# Patient Record
Sex: FEMALE | Race: WHITE | Employment: FULL TIME | URBAN - METROPOLITAN AREA
[De-identification: names, ages, dates, MRNs, and addresses within clinical notes are randomized per-mention and may not be internally consistent; named-entity substitution may affect disease eponyms.]

---

## 2017-07-24 ENCOUNTER — OFFICE VISIT (OUTPATIENT)
Dept: FAMILY MEDICINE CLINIC | Age: 42
End: 2017-07-24

## 2017-07-24 VITALS
RESPIRATION RATE: 20 BRPM | WEIGHT: 210 LBS | HEART RATE: 69 BPM | SYSTOLIC BLOOD PRESSURE: 124 MMHG | DIASTOLIC BLOOD PRESSURE: 82 MMHG | BODY MASS INDEX: 37.21 KG/M2 | HEIGHT: 63 IN | TEMPERATURE: 98.8 F | OXYGEN SATURATION: 98 %

## 2017-07-24 DIAGNOSIS — M53.3 SACRAL PAIN: Primary | ICD-10-CM

## 2017-07-24 DIAGNOSIS — E55.9 VITAMIN D DEFICIENCY: ICD-10-CM

## 2017-07-24 DIAGNOSIS — F51.04 PSYCHOPHYSIOLOGICAL INSOMNIA: ICD-10-CM

## 2017-07-24 DIAGNOSIS — Z00.00 ROUTINE GENERAL MEDICAL EXAMINATION AT A HEALTH CARE FACILITY: ICD-10-CM

## 2017-07-24 DIAGNOSIS — F41.9 ANXIETY: ICD-10-CM

## 2017-07-24 DIAGNOSIS — H61.21 IMPACTED CERUMEN OF RIGHT EAR: ICD-10-CM

## 2017-07-24 RX ORDER — TRAZODONE HYDROCHLORIDE 50 MG/1
50 TABLET ORAL
Qty: 30 TAB | Refills: 1 | Status: SHIPPED | OUTPATIENT
Start: 2017-07-24 | End: 2017-08-14 | Stop reason: SDUPTHER

## 2017-07-24 NOTE — PROGRESS NOTES
Assessment/Plan:    1. Sacral pain  -xray. Home stretches. Monitor. If persists, can do PT  - XR SACRUM AND COCCYX; Future    2. Impacted cerumen of right ear  - REMOVAL IMPACTED CERUMEN IRRIGATION/LVG UNILAT; Future    3. Psychophysiological insomnia  - traZODone (DESYREL) 50 mg tablet; Take 1 Tab by mouth nightly as needed for Sleep. Dispense: 30 Tab; Refill: 1    4. Vitamin D deficiency  - VITAMIN D, 25 HYDROXY; Future    5. Routine general medical examination at a health care facility  - METABOLIC PANEL, COMPREHENSIVE; Future  - LIPID PANEL; Future  - CBC W/O DIFF; Future    The plan was discussed with the patient. The patient verbalized understanding and is in agreement with the plan. All medication potential side effects were discussed with the patient. Health Maintenance:   Health Maintenance   Topic Date Due    INFLUENZA AGE 9 TO ADULT  08/01/2017    PAP AKA CERVICAL CYTOLOGY  01/01/2019    DTaP/Tdap/Td series (2 - Td) 01/01/2020       Wade Ch is a 35 y.o. female and presents with Physical     Subjective:  Feels well. Pt c/o dull constant R sacral pain. Worse with getting out of the car. Pain doesn't radiate. No pain at rest.  Has been ongoing x several weeks. Notes some difficulty sleeping. Melatonin was working, but then stopped. ROS:  Constitutional: No recent weight change. No weakness/fatigue. No f/c. Skin: No rashes, change in nails/hair, itching   HENT: No HA, dizziness. No hearing loss/tinnitus. No nasal congestion/discharge. Eyes: No change in vision, double/blurred vision or eye pain/redness. Cardiovascular: No CP/palpitations. No CRISTOBAL/orthopnea/PND. Respiratory: No cough/sputum, dyspnea, wheezing. Gastointestinal: No dysphagia, reflux. No n/v. No constipation/diarrhea. No melena/rectal bleeding. Genitourinary: No dysuria, urinary hesitancy, nocturia, hematuria. No incontinence. Musculoskeletal: No joint pain/stiffness.   No muscle pain/tenderness. Endo: No heat/cold intolerance, no polyuria/polydypsia. Heme: No h/o anemia. No easy bleeding/bruising. Allergy/Immunology: No seasonal rhinitis. Denies frequent colds, sinus/ear infections. Neurological: No seizures/numbness/weakness. No paresthesias. Psychiatric:  No depression, anxiety. The problem list was updated as a part of today's visit. Patient Active Problem List   Diagnosis Code    Anxiety F41.9    Vitamin D deficiency E55.9    Obesity (BMI 30-39. 9) E66.9    Irregular menstrual cycle N92.6    Menorrhagia N92.0    Depression F32.9       The PSH, FH were reviewed. SH:  Social History   Substance Use Topics    Smoking status: Former Smoker     Types: Cigarettes     Start date: 3/18/1989     Quit date: 3/18/1999    Smokeless tobacco: Never Used    Alcohol use 0.5 oz/week     1 Standard drinks or equivalent per week      Comment: < 1/week       Medications/Allergies:  Current Outpatient Prescriptions on File Prior to Visit   Medication Sig Dispense Refill    ALPRAZolam (XANAX) 0.25 mg tablet Take 1 Tab by mouth two (2) times daily as needed for Anxiety. Max Daily Amount: 0.5 mg. 30 Tab 0    ERGOCALCIFEROL, VITAMIN D2, (VITAMIN D2 PO) Take 1 Tab by mouth daily. No current facility-administered medications on file prior to visit. No Known Allergies    Objective:  Visit Vitals    /82    Pulse 69    Temp 98.8 °F (37.1 °C) (Oral)    Resp 20    Ht 5' 3.25\" (1.607 m)    Wt 210 lb (95.3 kg)    LMP 07/08/2017    SpO2 98%    BMI 36.91 kg/m2      Constitutional: Well developed, nourished, no distress, alert, obese habitus   HENT: Exterior ears and tympanic membranes normal bilaterally. Supple neck. No thyromegaly or lymphadenopathy. Oropharynx clear and moist mucous membranes. +impacted cerumen on R   Eyes: Conjunctiva normal. PERRL. CV: S1, S2.  RRR. No murmurs/rubs. No thrills palpated. No carotid bruits. Intact distal pulses.   No edema.   Pulm: No abnormalities on inspection. Clear to auscultation bilaterally. No wheezing/rhonchi. Normal effort. GI: Soft, nontender, nondistended. Normal active bowel sounds. MS: Gait normal.  Joints without deformity/tenderness. Strength intact bilateral upper and lower ext. Normal ROM all extremities. No paraspinal tenderness. Neuro: A/O x 3. No focal motor or sensory deficits. Speech normal.   Skin: No lesions/rashes on inspection. Psych: Appropriate affect, judgement and insight. Short-term memory intact.

## 2017-07-24 NOTE — PATIENT INSTRUCTIONS

## 2017-07-24 NOTE — PROGRESS NOTES
Raymond Davis is a 35 y.o. female  Patient here for physical.      1. Have you been to the ER, urgent care clinic since your last visit? Hospitalized since your last visit? No    2. Have you seen or consulted any other health care providers outside of the [facility] since your last visit? Include any pap smears or colon screening.  No

## 2017-07-24 NOTE — MR AVS SNAPSHOT
Visit Information Date & Time Provider Department Dept. Phone Encounter #  
 7/24/2017  3:30 PM [facility] [phone] [id number] Upcoming Health Maintenance Date Due INFLUENZA AGE 9 TO ADULT 8/1/2017 PAP AKA CERVICAL CYTOLOGY 1/1/2019 DTaP/Tdap/Td series (2 - Td) 1/1/2020 Allergies as of 7/24/2017  Review Complete On: 7/24/2017 By: Ramo Rosado MD  
 No Known Allergies Current Immunizations  Reviewed on 10/2/2014 Name Date Influenza Vaccine 10/2/2014, 10/25/2013 Influenza Vaccine (Quad) PF 8/29/2016  4:13 PM  
  
 Not reviewed this visit You Were Diagnosed With   
  
 Codes Comments Sacral pain    -  Primary ICD-10-CM: M53.3 ICD-9-CM: 724.6 Impacted cerumen of right ear     ICD-10-CM: H61.21 ICD-9-CM: 380.4 Psychophysiological insomnia     ICD-10-CM: F51.04 
ICD-9-CM: 307.42 Vitamin D deficiency     ICD-10-CM: E55.9 ICD-9-CM: 268.9 Routine general medical examination at a health care facility     ICD-10-CM: Z00.00 ICD-9-CM: V70.0 Vitals BP Pulse Temp Resp Height(growth percentile) Weight(growth percentile) 124/82 69 98.8 °F (37.1 °C) (Oral) 20 5' 3.25\" (1.607 m) 210 lb (95.3 kg) LMP SpO2 BMI OB Status Smoking Status 07/08/2017 98% 36.91 kg/m2 Having regular periods Former Smoker Vitals History BMI and BSA Data Body Mass Index Body Surface Area  
 36.91 kg/m 2 2.06 m 2 Preferred Pharmacy Pharmacy Name Phone [address] [address] [phone] Your Updated Medication List  
  
   
This list is accurate as of: 7/24/17  4:10 PM.  Always use your most recent med list.  
  
  
  
  
 ALPRAZolam 0.25 mg tablet Commonly known as:  Donata Carton Take 1 Tab by mouth two (2) times daily as needed for Anxiety. Max Daily Amount: 0.5 mg.  
  
 traZODone 50 mg tablet Commonly known as:  Noreene Epifanio Take 1 Tab by mouth nightly as needed for Sleep. VITAMIN D2 PO Take 1 Tab by mouth daily. Prescriptions Sent to Pharmacy Refills  
 traZODone (DESYREL) 50 mg tablet 1 Sig: Take 1 Tab by mouth nightly as needed for Sleep. Class: Normal  
 [facility] [address]  #: [phone] Route: Oral  
  
To-Do List   
 07/24/2017 Lab:  CBC W/O DIFF   
  
 07/24/2017 Lab:  LIPID PANEL   
  
 07/24/2017 Lab:  METABOLIC PANEL, COMPREHENSIVE   
  
 07/24/2017 Procedures:  REMOVAL IMPACTED CERUMEN IRRIGATION/LVG UNILAT   
  
 07/24/2017 Lab:  VITAMIN D, 25 HYDROXY   
  
 07/24/2017 Imaging:  XR SACRUM AND COCCYX Patient Instructions Back Stretches: Exercises Your Care Instructions Here are some examples of exercises for stretching your back. Start each exercise slowly. Ease off the exercise if you start to have pain. Your doctor or physical therapist will tell you when you can start these exercises and which ones will work best for you. How to do the exercises Overhead stretch 1. Stand comfortably with your feet shoulder-width apart. 2. Looking straight ahead, raise both arms over your head and reach toward the ceiling. Do not allow your head to tilt back. 3. Hold for 15 to 30 seconds, then lower your arms to your sides. 4. Repeat 2 to 4 times. Side stretch 1. Stand comfortably with your feet shoulder-width apart. 2. Raise one arm over your head, and then lean to the other side. 3. Slide your hand down your leg as you let the weight of your arm gently stretch your side muscles. Hold for 15 to 30 seconds. 4. Repeat 2 to 4 times on each side. Press-up 1. Lie on your stomach, supporting your body with your forearms. 2. Press your elbows down into the floor to raise your upper back.  As you do this, relax your stomach muscles and allow your back to arch without using your back muscles. As your press up, do not let your hips or pelvis come off the floor. 3. Hold for 15 to 30 seconds, then relax. 4. Repeat 2 to 4 times. Relax and rest 
 
1. Lie on your back with a rolled towel under your neck and a pillow under your knees. Extend your arms comfortably to your sides. 2. Relax and breathe normally. 3. Remain in this position for about 10 minutes. 4. If you can, do this 2 or 3 times each day. Follow-up care is a key part of your treatment and safety. Be sure to make and go to all appointments, and call your doctor if you are having problems. It's also a good idea to know your test results and keep a list of the medicines you take. Where can you learn more? Go to http://inderjit-amna.info/. Enter O284 in the search box to learn more about \"Back Stretches: Exercises. \" Current as of: March 21, 2017 Content Version: 11.3 © 4527-4729 [employer]. Care instructions adapted under license by [employer] ([employer]) (which disclaims liability or warranty for this information). If you have questions about a medical condition or this instruction, always ask your healthcare professional. [address] any warranty or liability for your use of this information. Introducing [facility]! Dear [place]: Thank you for requesting a stylemarks account. Our records indicate that you already have an active stylemarks account. You can access your account anytime at [email or web address] Did you know that you can access your hospital and ER discharge instructions at any time in stylemarks? You can also review all of your test results from your hospital stay or ER visit. Additional Information If you have questions, please visit the Frequently Asked Questions section of the stylemarks website at [email or web address] Remember, MyChart is NOT to be used for urgent needs. For medical emergencies, dial 911. Now available from your iPhone and Android! Please provide this summary of care documentation to your next provider. Your primary care clinician is listed as Deysi Phipps. If you have any questions after today's visit, please call [phone].

## 2017-07-25 RX ORDER — ALPRAZOLAM 0.25 MG/1
TABLET ORAL
Qty: 30 TAB | Refills: 0 | Status: SHIPPED | OUTPATIENT
Start: 2017-07-25 | End: 2018-01-11 | Stop reason: SDUPTHER

## 2017-08-01 ENCOUNTER — HOSPITAL ENCOUNTER (OUTPATIENT)
Dept: LAB | Age: 42
Discharge: HOME | End: 2017-08-01
Payer: COMMERCIAL

## 2017-08-01 DIAGNOSIS — Z00.00 ROUTINE GENERAL MEDICAL EXAMINATION AT A HEALTH CARE FACILITY: ICD-10-CM

## 2017-08-01 DIAGNOSIS — E55.9 VITAMIN D DEFICIENCY: ICD-10-CM

## 2017-08-01 LAB
25(OH)D3 SERPL-MCNC: 18.1 NG/ML (ref 30–100)
ALBUMIN SERPL BCP-MCNC: 3.7 G/DL (ref 3.4–5)
ALBUMIN/GLOB SERPL: 1.3 {RATIO} (ref 0.8–1.7)
ALP SERPL-CCNC: 84 U/L (ref 45–117)
ALT SERPL-CCNC: <6 U/L (ref 13–56)
ANION GAP BLD CALC-SCNC: 9 MMOL/L (ref 3–18)
AST SERPL W P-5'-P-CCNC: 18 U/L (ref 15–37)
BILIRUB SERPL-MCNC: 0.4 MG/DL (ref 0.2–1)
BUN SERPL-MCNC: 15 MG/DL (ref 7–18)
BUN/CREAT SERPL: 17 (ref 12–20)
CALCIUM SERPL-MCNC: 8.9 MG/DL (ref 8.5–10.1)
CHLORIDE SERPL-SCNC: 108 MMOL/L (ref 100–108)
CHOLEST SERPL-MCNC: 195 MG/DL
CO2 SERPL-SCNC: 25 MMOL/L (ref 21–32)
CREAT SERPL-MCNC: 0.88 MG/DL (ref 0.6–1.3)
ERYTHROCYTE [DISTWIDTH] IN BLOOD BY AUTOMATED COUNT: 13.7 % (ref 11.6–14.5)
GLOBULIN SER CALC-MCNC: 2.9 G/DL (ref 2–4)
GLUCOSE SERPL-MCNC: 106 MG/DL (ref 74–99)
HCT VFR BLD AUTO: 37.2 % (ref 35–45)
HDLC SERPL-MCNC: 50 MG/DL (ref 40–60)
HDLC SERPL: 3.9 {RATIO} (ref 0–5)
HGB BLD-MCNC: 11.6 G/DL (ref 12–16)
LDLC SERPL CALC-MCNC: 112.2 MG/DL (ref 0–100)
LIPID PROFILE,FLP: ABNORMAL
MCH RBC QN AUTO: 28.1 PG (ref 24–34)
MCHC RBC AUTO-ENTMCNC: 31.2 G/DL (ref 31–37)
MCV RBC AUTO: 90.1 FL (ref 74–97)
PLATELET # BLD AUTO: 302 K/UL (ref 135–420)
PMV BLD AUTO: 8.9 FL (ref 9.2–11.8)
POTASSIUM SERPL-SCNC: 4.4 MMOL/L (ref 3.5–5.5)
PROT SERPL-MCNC: 6.6 G/DL (ref 6.4–8.2)
RBC # BLD AUTO: 4.13 M/UL (ref 4.2–5.3)
SODIUM SERPL-SCNC: 142 MMOL/L (ref 136–145)
TRIGL SERPL-MCNC: 164 MG/DL (ref ?–150)
VLDLC SERPL CALC-MCNC: 32.8 MG/DL
WBC # BLD AUTO: 5.5 K/UL (ref 4.6–13.2)

## 2017-08-01 PROCEDURE — 36415 COLL VENOUS BLD VENIPUNCTURE: CPT | Performed by: INTERNAL MEDICINE

## 2017-08-01 PROCEDURE — 82306 VITAMIN D 25 HYDROXY: CPT | Performed by: INTERNAL MEDICINE

## 2017-08-01 PROCEDURE — 85027 COMPLETE CBC AUTOMATED: CPT | Performed by: INTERNAL MEDICINE

## 2017-08-01 PROCEDURE — 80061 LIPID PANEL: CPT | Performed by: INTERNAL MEDICINE

## 2017-08-01 PROCEDURE — 80053 COMPREHEN METABOLIC PANEL: CPT | Performed by: INTERNAL MEDICINE

## 2017-08-04 PROBLEM — R73.9 ELEVATED BLOOD SUGAR: Status: ACTIVE | Noted: 2017-08-04

## 2017-08-14 DIAGNOSIS — R73.9 ELEVATED BLOOD SUGAR: Primary | ICD-10-CM

## 2017-08-14 DIAGNOSIS — F51.04 PSYCHOPHYSIOLOGICAL INSOMNIA: ICD-10-CM

## 2017-08-14 DIAGNOSIS — E55.9 VITAMIN D DEFICIENCY: ICD-10-CM

## 2017-08-14 RX ORDER — TRAZODONE HYDROCHLORIDE 150 MG/1
150 TABLET ORAL
Qty: 90 TAB | Refills: 1 | Status: SHIPPED | OUTPATIENT
Start: 2017-08-14 | End: 2018-04-10 | Stop reason: SDUPTHER

## 2017-11-28 ENCOUNTER — HOSPITAL ENCOUNTER (OUTPATIENT)
Dept: LAB | Age: 42
Discharge: HOME | End: 2017-11-28
Payer: COMMERCIAL

## 2017-11-28 DIAGNOSIS — R73.9 ELEVATED BLOOD SUGAR: ICD-10-CM

## 2017-11-28 DIAGNOSIS — E55.9 VITAMIN D DEFICIENCY: ICD-10-CM

## 2017-11-28 LAB
25(OH)D3 SERPL-MCNC: 22.2 NG/ML (ref 30–100)
HBA1C MFR BLD: 5.4 % (ref 4.2–5.6)

## 2017-11-28 PROCEDURE — 83036 HEMOGLOBIN GLYCOSYLATED A1C: CPT | Performed by: INTERNAL MEDICINE

## 2017-11-28 PROCEDURE — 36415 COLL VENOUS BLD VENIPUNCTURE: CPT | Performed by: INTERNAL MEDICINE

## 2017-11-28 PROCEDURE — 82306 VITAMIN D 25 HYDROXY: CPT | Performed by: INTERNAL MEDICINE

## 2017-12-04 ENCOUNTER — OFFICE VISIT (OUTPATIENT)
Dept: FAMILY MEDICINE CLINIC | Age: 42
End: 2017-12-04

## 2017-12-04 VITALS
HEIGHT: 63 IN | HEART RATE: 87 BPM | WEIGHT: 207.4 LBS | RESPIRATION RATE: 20 BRPM | BODY MASS INDEX: 36.75 KG/M2 | DIASTOLIC BLOOD PRESSURE: 70 MMHG | OXYGEN SATURATION: 97 % | SYSTOLIC BLOOD PRESSURE: 110 MMHG | TEMPERATURE: 98.7 F

## 2017-12-04 DIAGNOSIS — E55.9 VITAMIN D DEFICIENCY: ICD-10-CM

## 2017-12-04 DIAGNOSIS — L65.9 HAIR LOSS: Primary | ICD-10-CM

## 2017-12-04 DIAGNOSIS — Z23 ENCOUNTER FOR IMMUNIZATION: ICD-10-CM

## 2017-12-04 RX ORDER — ERGOCALCIFEROL 1.25 MG/1
50000 CAPSULE ORAL
Qty: 12 CAP | Refills: 3 | Status: SHIPPED | OUTPATIENT
Start: 2017-12-04 | End: 2017-12-30 | Stop reason: SDUPTHER

## 2017-12-04 NOTE — PROGRESS NOTES
Per verbal orders of Dr. Levert Opitz, injection of flu shot given by Wander Haines LPN. Patient instructed to remain in clinic for 20 minutes afterwards, and to report any adverse reaction to me immediately. Vaccine documentation completed. Tolerated well.    Consent signed

## 2017-12-04 NOTE — PROGRESS NOTES
Assessment/Plan:    1. Hair loss  -ck tsh. If nml, will send in low dose spironolactone  - TSH 3RD GENERATION; Future    2. Vitamin D deficiency  -increase to 50,000 units. - ergocalciferol (VITAMIN D2) 50,000 unit capsule; Take 1 Cap by mouth every seven (7) days for 12 doses. Dispense: 12 Cap; Refill: 3  - VITAMIN D, 25 HYDROXY; Future    3. Encounter for immunization  - Influenza virus vaccine (QUADRIVALENT PRES FREE SYRINGE) IM ([id number])  - SC IMMUNIZ ADMIN,1 SINGLE/COMB VAC/TOXOID    The plan was discussed with the patient. The patient verbalized understanding and is in agreement with the plan. All medication potential side effects were discussed with the patient. Health Maintenance:   Health Maintenance   Topic Date Due    Influenza Age 5 to Adult  08/01/2017    PAP AKA CERVICAL CYTOLOGY  01/01/2019    DTaP/Tdap/Td series (2 - Td) 01/01/2020       Roque Case is a 38 y.o. female and presents with Vitamin D Deficiency     Subjective:    Pt notes thinning in her hairline x \"a long time\", worsening over the past few weeks. She colors her hair every 6-8 weeks. Usually wears her hair down. No alopecia. MGM had same hair loss. She notes watery eyes that happens intermittently (1x/week). Eye drops resolve the issue. No itching or eye pain. No redness. Vit d remains low. Despite increased dose. ROS:  Constitutional: No recent weight change. No weakness/fatigue. No f/c. Skin: No rashes, +change hair, no itching   Cardiovascular: No CP/palpitations. No CRISTOBAL/orthopnea/PND. Respiratory: No cough/sputum, dyspnea, wheezing. The problem list was updated as a part of today's visit. Patient Active Problem List   Diagnosis Code    Anxiety F41.9    Vitamin D deficiency E55.9    Obesity (BMI 30-39. 9) E66.9    Irregular menstrual cycle N92.6    Menorrhagia N92.0    Depression F32.9    Psychophysiological insomnia F51.04    Elevated blood sugar R73.9       The PSH, FH were reviewed. SH:  Social History   Substance Use Topics    Smoking status: Former Smoker     Types: Cigarettes     Start date: 3/18/1989     Quit date: 3/18/1999    Smokeless tobacco: Never Used    Alcohol use 0.5 oz/week     1 Standard drinks or equivalent per week      Comment: < 1/week       Medications/Allergies:  Current Outpatient Prescriptions on File Prior to Visit   Medication Sig Dispense Refill    traZODone (DESYREL) 150 mg tablet Take 1 Tab by mouth nightly as needed for Sleep. 90 Tab 1    ALPRAZolam (XANAX) 0.25 mg tablet TAKE 1 TABLET BY MOUTH TWICE DAILY AS NEEDED FOR ANXIETY. MAXIMUM DAILY AMOUNT OF 0.5MG. 30 Tab 0    ERGOCALCIFEROL, VITAMIN D2, (VITAMIN D2 PO) Take 1 Tab by mouth daily. No current facility-administered medications on file prior to visit. No Known Allergies    Objective:  Visit Vitals    /70 (BP 1 Location: Left arm, BP Patient Position: Sitting)    Pulse 87    Temp 98.7 °F (37.1 °C) (Oral)    Resp 20    Ht 5' 3.25\" (1.607 m)    Wt 207 lb 6.4 oz (94.1 kg)    LMP 11/27/2017    SpO2 97%    BMI 36.45 kg/m2      Constitutional: Well developed, nourished, no distress, alert   CV: S1, S2.  RRR. No murmurs/rubs. No thrills palpated. No carotid bruits. Intact distal pulses. No edema. Pulm: No abnormalities on inspection. Clear to auscultation bilaterally. No wheezing/rhonchi. Normal effort. Neuro: A/O x 3. No focal motor or sensory deficits.  Speech normal.   Skin: Generalized thinning over frontal and crown of head

## 2017-12-04 NOTE — PROGRESS NOTES
Holli Hopson is a 38 y.o. female (: 1975) presenting to address:    Chief Complaint   Patient presents with    Vitamin D Deficiency       Vitals:    17 1524   BP: 110/70   Pulse: 87   Resp: 20   Temp: 98.7 °F (37.1 °C)   TempSrc: Oral   SpO2: 97%   Weight: 207 lb 6.4 oz (94.1 kg)   Height: 5' 3.25\" (1.607 m)   PainSc:   0 - No pain   LMP: 2017       Learning Assessment:     Learning Assessment 3/18/2015   PRIMARY LEARNER Patient   HIGHEST LEVEL OF EDUCATION - PRIMARY LEARNER  -   BARRIERS PRIMARY LEARNER -   CO-LEARNER CAREGIVER -   PRIMARY LANGUAGE ENGLISH   LEARNER PREFERENCE PRIMARY DEMONSTRATION   ANSWERED BY Patient   RELATIONSHIP SELF     Depression Screening:     PHQ over the last two weeks 3/18/2015   Little interest or pleasure in doing things Not at all   Feeling down, depressed or hopeless Not at all   Total Score PHQ 2 0     Fall Risk Assessment:     Fall Risk Assessment, last 12 mths 2017   Able to walk? Yes   Fall in past 12 months? No     Abuse Screening:     Abuse Screening Questionnaire 2017   Do you ever feel afraid of your partner? N   Are you in a relationship with someone who physically or mentally threatens you? N   Is it safe for you to go home? Y     Coordination of Care Questionaire:   1. Have you been to the ER, urgent care clinic since your last visit? Hospitalized since your last visit? NO    2. Have you seen or consulted any other health care providers outside of the [address] [place] [address] since your last visit? Include any pap smears or colon screening. NO    Advanced Directive:   1. Do you have an Advanced Directive? NO    2. Would you like information on Advanced Directives?  YES

## 2017-12-04 NOTE — MR AVS SNAPSHOT
Visit Information Date & Time Provider Department Dept. Phone Encounter #  
 12/4/2017  3:45 PM Phi Butt, [address] [facility] Eduardo [phone] [id number] Your Appointments 12/4/2017  3:45 PM  
Follow Up with Phi Butt MD  
[address] [facility]) Appt Note: f/u per mychart [address] [facility] [address]  
[phone]  
  
   
 [address] [facility] [address] [facility] Upcoming Health Maintenance Date Due Influenza Age 5 to Adult 8/1/2017 PAP AKA CERVICAL CYTOLOGY 1/1/2019 DTaP/Tdap/Td series (2 - Td) 1/1/2020 Allergies as of 12/4/2017  Review Complete On: 12/4/2017 By: Ghulam Welsh No Known Allergies Current Immunizations  Reviewed on 10/2/2014 Name Date Influenza Vaccine 10/2/2014, 10/25/2013 Influenza Vaccine (Quad) PF  Incomplete, 8/29/2016  4:13 PM  
  
 Not reviewed this visit You Were Diagnosed With   
  
 Codes Comments Hair loss    -  Primary ICD-10-CM: L65.9 ICD-9-CM: 704.00 Vitamin D deficiency     ICD-10-CM: E55.9 ICD-9-CM: 268.9 Encounter for immunization     ICD-10-CM: P78 ICD-9-CM: V03.89 Vitals BP Pulse Temp Resp Height(growth percentile) Weight(growth percentile) 110/70 (BP 1 Location: Left arm, BP Patient Position: Sitting) 87 98.7 °F (37.1 °C) (Oral) 20 5' 3.25\" (1.607 m) 207 lb 6.4 oz (94.1 kg) LMP SpO2 BMI OB Status Smoking Status 11/27/2017 97% 36.45 kg/m2 Having regular periods Former Smoker Vitals History BMI and BSA Data Body Mass Index Body Surface Area  
 36.45 kg/m 2 2.05 m 2 Preferred Pharmacy Pharmacy Name Phone [address] [facility], [facility] [address] [phone] Your Updated Medication List  
  
   
This list is accurate as of: 12/4/17  3:42 PM.  Always use your most recent med list.  
  
  
  
  
 ALPRAZolam 0.25 mg tablet Commonly known as:  XANAX  
TAKE 1 TABLET BY MOUTH TWICE DAILY AS NEEDED FOR ANXIETY. MAXIMUM DAILY AMOUNT OF 0.5MG. ergocalciferol 50,000 unit capsule Commonly known as:  VITAMIN D2 Take 1 Cap by mouth every seven (7) days for 12 doses. traZODone 150 mg tablet Commonly known as:  Madolyn Saas Take 1 Tab by mouth nightly as needed for Sleep. Prescriptions Sent to Pharmacy Refills  
 ergocalciferol (VITAMIN D2) 50,000 unit capsule 3 Sig: Take 1 Cap by mouth every seven (7) days for 12 doses. Class: Normal  
 Pharmacy: [facility] [address] [facility] [employer]  #: [phone] Route: Oral  
  
We Performed the Following INFLUENZA VIRUS VAC QUAD,SPLIT,PRESV FREE SYRINGE IM [id number] CPT(R)] OH IMMUNIZ ADMIN,1 SINGLE/COMB VAC/TOXOID [id number] CPT(R)] To-Do List   
 12/04/2017 Lab:  TSH 3RD GENERATION   
  
 12/04/2017 Lab:  VITAMIN D, 25 HYDROXY Patient Instructions Spironolactone (By mouth) Spironolactone (pfdw-uv-op-LAK-tone) Treats high blood pressure, edema (fluid retention), or high levels of aldosterone (a hormone). This medicine is a potassium-sparing diuretic (water pill). Brand Name(s): Aldactone There may be other brand names for this medicine. When This Medicine Should Not Be Used: This medicine is not right for everyone. Do not use it if you had an allergic reaction to spironolactone, or if you have Jacques disease. How to Use This Medicine:  
Tablet · Take your medicine as directed. Your dose may need to be changed several times to find what works best for you. · The oral liquid may be taken with or without food, but it should be taken the same way (with or without food) each day.  
· Measure the oral liquid medicine with a marked measuring spoon, oral syringe, or medicine cup. Shake well before each use. · Missed dose: Take a dose as soon as you remember. If it is almost time for your next dose, wait until then and take a regular dose. Do not take extra medicine to make up for a missed dose. · Store the medicine in a closed container at room temperature, away from heat, moisture, and direct light. Drugs and Foods to Avoid: Ask your doctor or pharmacist before using any other medicine, including over-the-counter medicines, vitamins, and herbal products. · Do not use this medicine together with eplerenone. · Some foods and medicines can affect how spironolactone works. Tell your doctor if you are using any of the following: ¨ Cholestyramine, cisplatin, digoxin, heparin, lithium, trimethoprim ¨ Another blood pressure medicine, including an angiotensin receptor blocker (ARB) or an ACE inhibitor ¨ NSAID pain or arthritis medicine (including aspirin, celecoxib, diclofenac, ibuprofen, indomethacin, naproxen) ¨ Steroid medicine (including hydrocortisone, methylprednisolone, prednisolone, prednisone) · Ask your doctor before you use any medicine, supplement, or salt substitute that contains potassium. You could have high levels of potassium in your blood that would cause serious health problems. · Alcohol, narcotic pain relievers, or sleeping pills may cause you to feel more lightheaded, dizzy, or faint when used with this medicine. Warnings While Using This Medicine: · Tell your doctor if you are pregnant or breastfeeding, or if you have kidney disease, liver disease, diabetes, gout, or trouble urinating. · This medicine may cause the following problems: 
¨ Low blood pressure ¨ Worsening of kidney function ¨ Electrolyte imbalance ¨ High uric acid and blood sugar · This medicine may make you dizzy or drowsy. Do not drive or do anything else that could be dangerous until you know how this medicine affects you. · Do not stop using the medicine without asking your doctor, even if you feel well. This medicine will not cure your high blood pressure, but it will help keep it in the normal range. You may have to take blood pressure medicine for the rest of your life. · Tell any doctor or dentist who treats you that you are using this medicine. · Your doctor will do lab tests at regular visits to check on the effects of this medicine. Keep all appointments. · Keep all medicine out of the reach of children. Never share your medicine with anyone. Possible Side Effects While Using This Medicine:  
Call your doctor right away if you notice any of these side effects: · Allergic reaction: Itching or hives, swelling in your face or hands, swelling or tingling in your mouth or throat, chest tightness, trouble breathing · Blistering, peeling, red skin rash · Blood in your stools or dark stools, vomiting blood or material that looks like coffee grounds · Confusion, weakness, uneven heartbeat, trouble breathing, numbness or tingling in your hands, feet, or lips · Dry mouth, increased thirst, muscle cramps, nausea, vomiting · Increased hunger or thirst, change in how much or how often you urinate, unusual weight loss · Lightheadedness, dizziness, drowsiness, fainting · Muscle twitching · Unusual bleeding, bruising, or weakness If you notice these less serious side effects, talk with your doctor: · Breast swelling, enlargement, pain, or tenderness If you notice other side effects that you think are caused by this medicine, tell your doctor. Call your doctor for medical advice about side effects. You may report side effects to FDA at 6-388-FDA-2293 © 2017 [address] Information is for End User's use only and may not be sold, redistributed or otherwise used for commercial purposes. The above information is an  only.  It is not intended as medical advice for individual conditions or treatments. Talk to your doctor, nurse or pharmacist before following any medical regimen to see if it is safe and effective for you. Introducing [address]! Dear Woo Coles: Thank you for requesting a [employer] account. Our records indicate that you already have an active [employer] account. You can access your account anytime at [email or web address]. [email or web address] Did you know that you can access your hospital and ER discharge instructions at any time in [employer]? You can also review all of your test results from your hospital stay or ER visit. Additional Information If you have questions, please visit the Frequently Asked Questions section of the [employer] website at [email or web address] Remember, [employer] is NOT to be used for urgent needs. For medical emergencies, dial 911. Now available from your iPhone and Android! Please provide this summary of care documentation to your next provider. Your primary care clinician is listed as Deysi Phipps. If you have any questions after today's visit, please call [phone].

## 2017-12-04 NOTE — PATIENT INSTRUCTIONS
Spironolactone (By mouth)   Spironolactone (pyen-eu-kl-LAK-tone)  Treats high blood pressure, edema (fluid retention), or high levels of aldosterone (a hormone). This medicine is a potassium-sparing diuretic (water pill). Brand Name(s): Aldactone   There may be other brand names for this medicine. When This Medicine Should Not Be Used: This medicine is not right for everyone. Do not use it if you had an allergic reaction to spironolactone, or if you have Dresden disease. How to Use This Medicine:   Tablet  · Take your medicine as directed. Your dose may need to be changed several times to find what works best for you. · The oral liquid may be taken with or without food, but it should be taken the same way (with or without food) each day. · Measure the oral liquid medicine with a marked measuring spoon, oral syringe, or medicine cup. Shake well before each use. · Missed dose: Take a dose as soon as you remember. If it is almost time for your next dose, wait until then and take a regular dose. Do not take extra medicine to make up for a missed dose. · Store the medicine in a closed container at room temperature, away from heat, moisture, and direct light. Drugs and Foods to Avoid:   Ask your doctor or pharmacist before using any other medicine, including over-the-counter medicines, vitamins, and herbal products. · Do not use this medicine together with eplerenone. · Some foods and medicines can affect how spironolactone works.  Tell your doctor if you are using any of the following:   ¨ Cholestyramine, cisplatin, digoxin, heparin, lithium, trimethoprim  ¨ Another blood pressure medicine, including an angiotensin receptor blocker (ARB) or an ACE inhibitor  ¨ NSAID pain or arthritis medicine (including aspirin, celecoxib, diclofenac, ibuprofen, indomethacin, naproxen)  ¨ Steroid medicine (including hydrocortisone, methylprednisolone, prednisolone, prednisone)  · Ask your doctor before you use any medicine, supplement, or salt substitute that contains potassium. You could have high levels of potassium in your blood that would cause serious health problems. · Alcohol, narcotic pain relievers, or sleeping pills may cause you to feel more lightheaded, dizzy, or faint when used with this medicine. Warnings While Using This Medicine:   · Tell your doctor if you are pregnant or breastfeeding, or if you have kidney disease, liver disease, diabetes, gout, or trouble urinating. · This medicine may cause the following problems:  ¨ Low blood pressure  ¨ Worsening of kidney function  ¨ Electrolyte imbalance  ¨ High uric acid and blood sugar  · This medicine may make you dizzy or drowsy. Do not drive or do anything else that could be dangerous until you know how this medicine affects you. · Do not stop using the medicine without asking your doctor, even if you feel well. This medicine will not cure your high blood pressure, but it will help keep it in the normal range. You may have to take blood pressure medicine for the rest of your life. · Tell any doctor or dentist who treats you that you are using this medicine. · Your doctor will do lab tests at regular visits to check on the effects of this medicine. Keep all appointments. · Keep all medicine out of the reach of children. Never share your medicine with anyone.   Possible Side Effects While Using This Medicine:   Call your doctor right away if you notice any of these side effects:  · Allergic reaction: Itching or hives, swelling in your face or hands, swelling or tingling in your mouth or throat, chest tightness, trouble breathing  · Blistering, peeling, red skin rash  · Blood in your stools or dark stools, vomiting blood or material that looks like coffee grounds  · Confusion, weakness, uneven heartbeat, trouble breathing, numbness or tingling in your hands, feet, or lips  · Dry mouth, increased thirst, muscle cramps, nausea, vomiting  · Increased hunger or thirst, change in how much or how often you urinate, unusual weight loss  · Lightheadedness, dizziness, drowsiness, fainting  · Muscle twitching  · Unusual bleeding, bruising, or weakness  If you notice these less serious side effects, talk with your doctor:   · Breast swelling, enlargement, pain, or tenderness  If you notice other side effects that you think are caused by this medicine, tell your doctor. Call your doctor for medical advice about side effects. You may report side effects to FDA at 9-757-MLM-3926  © 2017 [address] Information is for End User's use only and may not be sold, redistributed or otherwise used for commercial purposes. The above information is an  only. It is not intended as medical advice for individual conditions or treatments. Talk to your doctor, nurse or pharmacist before following any medical regimen to see if it is safe and effective for you.

## 2017-12-06 ENCOUNTER — HOSPITAL ENCOUNTER (OUTPATIENT)
Dept: LAB | Age: 42
Discharge: HOME | End: 2017-12-06
Payer: COMMERCIAL

## 2017-12-06 DIAGNOSIS — L65.9 HAIR LOSS: ICD-10-CM

## 2017-12-06 LAB — TSH SERPL DL<=0.05 MIU/L-ACNC: 1.98 UIU/ML (ref 0.36–3.74)

## 2017-12-06 PROCEDURE — 36415 COLL VENOUS BLD VENIPUNCTURE: CPT | Performed by: INTERNAL MEDICINE

## 2017-12-06 PROCEDURE — 84443 ASSAY THYROID STIM HORMONE: CPT | Performed by: INTERNAL MEDICINE

## 2017-12-07 DIAGNOSIS — L65.8 FEMALE PATTERN BALDNESS: Primary | ICD-10-CM

## 2017-12-07 RX ORDER — SPIRONOLACTONE 25 MG/1
25 TABLET ORAL DAILY
Qty: 90 TAB | Refills: 0 | Status: SHIPPED | OUTPATIENT
Start: 2017-12-07 | End: 2017-12-30 | Stop reason: SDUPTHER

## 2017-12-30 DIAGNOSIS — E55.9 VITAMIN D DEFICIENCY: ICD-10-CM

## 2017-12-30 DIAGNOSIS — L65.8 FEMALE PATTERN BALDNESS: ICD-10-CM

## 2018-01-02 RX ORDER — ERGOCALCIFEROL 1.25 MG/1
50000 CAPSULE ORAL
Qty: 12 CAP | Refills: 3 | Status: SHIPPED | OUTPATIENT
Start: 2018-01-02 | End: 2018-01-02 | Stop reason: SDUPTHER

## 2018-01-02 RX ORDER — SPIRONOLACTONE 25 MG/1
25 TABLET ORAL DAILY
Qty: 90 TAB | Refills: 0 | Status: SHIPPED | OUTPATIENT
Start: 2018-01-02 | End: 2018-03-12 | Stop reason: SDUPTHER

## 2018-01-02 RX ORDER — ERGOCALCIFEROL 1.25 MG/1
50000 CAPSULE ORAL
Qty: 12 CAP | Refills: 3 | Status: SHIPPED | OUTPATIENT
Start: 2018-01-02 | End: 2018-02-15 | Stop reason: SDUPTHER

## 2018-01-02 NOTE — TELEPHONE ENCOUNTER
From: Justin Amaya  To: Mckenzie Dumont MD  Sent: 12/30/2017 4:41 AM EST  Subject: Medication Renewal Request    Original authorizing provider: MD Justin Chand would like a refill of the following medications:  ergocalciferol (VITAMIN D2) 50,000 unit capsule Mckenzie Dumont MD]    Preferred pharmacy: [facility]    Comment:

## 2018-01-02 NOTE — TELEPHONE ENCOUNTER
From: Justin Amaya  To: Mckenzie Dumont MD  Sent: 12/30/2017 4:40 AM EST  Subject: Medication Renewal Request    Original authorizing provider: MD Justin Chand would like a refill of the following medications:  ergocalciferol (VITAMIN D2) 50,000 unit capsule Mckenzie Dumont MD]  spironolactone (ALDACTONE) 25 mg tablet Mckenzie Dumont MD]    Preferred pharmacy: [address] [place] [address]    Comment:

## 2018-01-11 DIAGNOSIS — F41.9 ANXIETY: ICD-10-CM

## 2018-01-12 RX ORDER — ALPRAZOLAM 0.25 MG/1
TABLET ORAL
Qty: 30 TAB | Refills: 0 | Status: SHIPPED | OUTPATIENT
Start: 2018-01-12 | End: 2018-10-03 | Stop reason: SDUPTHER

## 2018-01-24 ENCOUNTER — HOSPITAL ENCOUNTER (OUTPATIENT)
Dept: LAB | Age: 43
Discharge: HOME | End: 2018-01-24
Payer: COMMERCIAL

## 2018-01-24 DIAGNOSIS — E55.9 VITAMIN D DEFICIENCY: ICD-10-CM

## 2018-01-24 LAB — 25(OH)D3 SERPL-MCNC: 24.4 NG/ML (ref 30–100)

## 2018-01-24 PROCEDURE — 82306 VITAMIN D 25 HYDROXY: CPT | Performed by: INTERNAL MEDICINE

## 2018-01-24 PROCEDURE — 36415 COLL VENOUS BLD VENIPUNCTURE: CPT | Performed by: INTERNAL MEDICINE

## 2018-02-15 ENCOUNTER — OFFICE VISIT (OUTPATIENT)
Dept: FAMILY MEDICINE CLINIC | Age: 43
End: 2018-02-15

## 2018-02-15 VITALS
RESPIRATION RATE: 20 BRPM | BODY MASS INDEX: 35.61 KG/M2 | HEIGHT: 63 IN | SYSTOLIC BLOOD PRESSURE: 108 MMHG | DIASTOLIC BLOOD PRESSURE: 80 MMHG | WEIGHT: 201 LBS | OXYGEN SATURATION: 97 % | TEMPERATURE: 98.5 F | HEART RATE: 79 BPM

## 2018-02-15 DIAGNOSIS — E55.9 VITAMIN D DEFICIENCY: ICD-10-CM

## 2018-02-15 DIAGNOSIS — L65.8 FEMALE PATTERN BALDNESS: ICD-10-CM

## 2018-02-15 DIAGNOSIS — Z00.00 ROUTINE GENERAL MEDICAL EXAMINATION AT A HEALTH CARE FACILITY: Primary | ICD-10-CM

## 2018-02-15 DIAGNOSIS — R73.9 ELEVATED BLOOD SUGAR: ICD-10-CM

## 2018-02-15 RX ORDER — FINASTERIDE 1 MG/1
1 TABLET, FILM COATED ORAL DAILY
Qty: 90 TAB | Refills: 0 | Status: SHIPPED | OUTPATIENT
Start: 2018-02-15 | End: 2018-02-19 | Stop reason: SDUPTHER

## 2018-02-15 RX ORDER — ERGOCALCIFEROL 1.25 MG/1
50000 CAPSULE ORAL
Qty: 12 CAP | Refills: 0 | Status: SHIPPED | OUTPATIENT
Start: 2018-02-15 | End: 2018-02-19 | Stop reason: SDUPTHER

## 2018-02-15 NOTE — PATIENT INSTRUCTIONS
Finasteride (By mouth)   Finasteride (fin-AS-ter-jack)  Treats benign prostatic hyperplasia (enlarged prostate). Also treats hair loss in men. Brand Name(s): Propecia, Proscar   There may be other brand names for this medicine. When This Medicine Should Not Be Used: You should not use this medicine if you have had an allergic reaction to finasteride. Women and children should not use this medicine. How to Use This Medicine:   Tablet  · Your doctor will tell you how much medicine to use. Do not use more than directed. · You may take this medicine with or without food. · Take this medicine at the same time each day. · For hair loss, you may need to take this medicine for 3 months or longer before you see an effect. · For an enlarged prostate, you may need to take this medicine for up to 6 months to see the full effect. · Read and follow the patient instructions that come with this medicine. Talk to your doctor or pharmacist if you have any questions. If a dose is missed:   · If you miss a dose or forget to take your medicine, skip the missed dose and take your next regular dose. Do not use extra medicine to make up for a missed dose. How to Store and Dispose of This Medicine:   · Store the medicine in a closed container at room temperature, away from heat, moisture, and direct light. · Ask your pharmacist, doctor, or health caregiver about the best way to dispose of any outdated medicine or medicine no longer needed. · Keep all medicine out of the reach of children. Never share your medicine with anyone. Drugs and Foods to Avoid:      Ask your doctor or pharmacist before using any other medicine, including over-the-counter medicines, vitamins, and herbal products. Warnings While Using This Medicine:   · Women who are pregnant or may become pregnant should avoid handling or touching this medicine. This medicine can get into the body through the skin and may harm an unborn male baby.  If any of this medicine gets on the skin of a woman, wash the area immediately with soap and water. · Make sure your doctor knows if you have liver disease or severe problems urinating. · This medicine will not cure an enlarged prostate problem or male pattern baldness. Once you stop using this medicine, the prostate will begin to grow again within a few months and any new hair will be lost within 12 months. · This medicine may cause changes to the breast tissue. Tell your doctor if you have any lumps, pain, tenderness, or an enlargement of the breasts while using this medicine. · This medicine will not prevent prostate cancer but may increase your risk of developing high-grade prostate cancer. Tell your doctor if you have concerns about this risk. · This medicine may affect the results of the prostate specific antigen (PSA) test, which may be used to detect prostate cancer. Make sure you tell all of your doctors that you are using this medicine. · This medicine may cause a decrease in the amount of semen you ejaculate during sex. This will not affect your sperm count or your ability to have children. · Your doctor will check your progress and the effects of this medicine at regular visits. Keep all appointments. Blood tests may be needed to check for unwanted effects. Possible Side Effects While Using This Medicine:   Call your doctor right away if you notice any of these side effects:  · Allergic reaction: Itching or hives, swelling in your face or hands, swelling or tingling in your mouth or throat, chest tightness, trouble breathing  · Breast swelling, pain, or tenderness. · Lightheadedness, dizziness, or fainting. · Lumps in the breast or under the arm. · Pain in the testicles. · Swelling in your hands, ankles, or feet. If you notice these less serious side effects, talk with your doctor:   · Decrease in the amount of semen. · Loss of interest in sex or the ability to have sex (impotence).   · Skin rash.  · Sleepiness or unusual drowsiness. · Stuffy or runny nose. · Trouble having or keeping an erection. · Weakness. If you notice other side effects that you think are caused by this medicine, tell your doctor. Call your doctor for medical advice about side effects. You may report side effects to FDA at 7-107-FDA-3448  © 2017 [address]  Information is for End User's use only and may not be sold, redistributed or otherwise used for commercial purposes. The above information is an  only. It is not intended as medical advice for individual conditions or treatments. Talk to your doctor, nurse or pharmacist before following any medical regimen to see if it is safe and effective for you.

## 2018-02-15 NOTE — PROGRESS NOTES
Madhavi Benitez is a 38 y.o. female (: 1975) presenting to address:    Chief Complaint   Patient presents with    Complete Physical       Vitals:    02/15/18 1456   BP: 108/80   Pulse: 79   Resp: 20   Temp: 98.5 °F (36.9 °C)   TempSrc: Oral   SpO2: 97%   Weight: 201 lb (91.2 kg)   Height: 5' 3.25\" (1.607 m)   PainSc:   0 - No pain   LMP: 2018       Hearing/Vision:      Visual Acuity Screening    Right eye Left eye Both eyes   Without correction:      With correction: 20/15 20/15 20/15       Learning Assessment:     Learning Assessment 3/18/2015   PRIMARY LEARNER Patient   HIGHEST LEVEL OF EDUCATION - PRIMARY LEARNER  -   BARRIERS PRIMARY LEARNER -   CO-LEARNER CAREGIVER -   PRIMARY LANGUAGE ENGLISH   LEARNER PREFERENCE PRIMARY DEMONSTRATION   ANSWERED BY Patient   RELATIONSHIP SELF     Depression Screening:     PHQ over the last two weeks 2/15/2018   Little interest or pleasure in doing things Not at all   Feeling down, depressed or hopeless Not at all   Total Score PHQ 2 0     Fall Risk Assessment:     Fall Risk Assessment, last 12 mths 2/15/2018   Able to walk? Yes   Fall in past 12 months? No     Abuse Screening:     Abuse Screening Questionnaire 2017   Do you ever feel afraid of your partner? N   Are you in a relationship with someone who physically or mentally threatens you? N   Is it safe for you to go home? Y     Coordination of Care Questionaire:   1. Have you been to the ER, urgent care clinic since your last visit? Hospitalized since your last visit? NO    2. Have you seen or consulted any other health care providers outside of the [address] [place] [address] since your last visit? Include any pap smears or colon screening. NO    Advanced Directive:   1. Do you have an Advanced Directive? YES    2. Would you like information on Advanced Directives?  NO

## 2018-02-15 NOTE — PROGRESS NOTES
Assessment/Plan:    1. Routine general medical examination at a health care facility  - METABOLIC PANEL, COMPREHENSIVE; Future  - LIPID PANEL; Future  - CBC W/O DIFF; Future    2. Vitamin D deficiency  -increase vit d to twice weekly. - ergocalciferol (VITAMIN D2) 50,000 unit capsule; Take 1 Cap by mouth every Monday and Thursday for 24 doses. Dispense: 12 Cap; Refill: 0    3. Elevated blood sugar  - HEMOGLOBIN A1C W/O EAG; Future    4. Female pattern baldness  -cont spirolactone. Add propecia. - finasteride (PROPECIA) 1 mg tablet; Take 1 Tab by mouth daily. Dispense: 90 Tab; Refill: 0    5. Class 2 obesity due to excess calories with serious comorbidity and body mass index (BMI) of 35.0 to 35.9 in adult  -work on wt loss    The plan was discussed with the patient. The patient verbalized understanding and is in agreement with the plan. All medication potential side effects were discussed with the patient. Health Maintenance:   Health Maintenance   Topic Date Due    PAP AKA CERVICAL CYTOLOGY  01/01/2019    DTaP/Tdap/Td series (2 - Td) 01/01/2020    Influenza Age 5 to Adult  Completed       Oc Cobian is a 38 y.o. female and presents with Complete Physical     Subjective:  Here for physical.     Vit D def - on weekly vit d. Still low. Has female pattern baldness- notes less hair loss with spironolactone. But no increase in hair growth. ROS:  Constitutional: No recent weight change. No weakness/fatigue. No f/c. Skin: No rashes, change in nails/hair, itching   HENT: No HA, dizziness. No hearing loss/tinnitus. No nasal congestion/discharge. Eyes: No change in vision, double/blurred vision or eye pain/redness. Cardiovascular: No CP/palpitations. No CRISTOBAL/orthopnea/PND. Respiratory: No cough/sputum, dyspnea, wheezing. Gastointestinal: No dysphagia, reflux. No n/v. No constipation/diarrhea. No melena/rectal bleeding.    Genitourinary: No dysuria, urinary hesitancy, nocturia, hematuria. No incontinence. Musculoskeletal: No joint pain/stiffness. No muscle pain/tenderness. Endo: No heat/cold intolerance, no polyuria/polydypsia. Heme: No h/o anemia. No easy bleeding/bruising. Allergy/Immunology: No seasonal rhinitis. Denies frequent colds, sinus/ear infections. Neurological: No seizures/numbness/weakness. No paresthesias. Psychiatric:  No depression, anxiety. The problem list was updated as a part of today's visit. Patient Active Problem List   Diagnosis Code    Anxiety F41.9    Vitamin D deficiency E55.9    Obesity (BMI 30-39. 9) E66.9    Irregular menstrual cycle N92.6    Menorrhagia N92.0    Depression F32.9    Psychophysiological insomnia F51.04    Elevated blood sugar R73.9    Female pattern baldness L65.8       The PSH, FH were reviewed. SH:  Social History   Substance Use Topics    Smoking status: Former Smoker     Types: Cigarettes     Start date: 3/18/1989     Quit date: 3/18/1999    Smokeless tobacco: Never Used    Alcohol use 0.5 oz/week     1 Standard drinks or equivalent per week      Comment: < 1/week       Medications/Allergies:  Current Outpatient Prescriptions on File Prior to Visit   Medication Sig Dispense Refill    ALPRAZolam (XANAX) 0.25 mg tablet TAKE 1 TABLET BY MOUTH TWICE DAILY AS NEEDED FOR ANXIETY. MAXIMUM AMOUNT IS 2 TABLETS A DAY 30 Tab 0    spironolactone (ALDACTONE) 25 mg tablet Take 1 Tab by mouth daily. 90 Tab 0    ergocalciferol (VITAMIN D2) 50,000 unit capsule Take 1 Cap by mouth every seven (7) days for 12 doses. 12 Cap 3    traZODone (DESYREL) 150 mg tablet Take 1 Tab by mouth nightly as needed for Sleep. 90 Tab 1     No current facility-administered medications on file prior to visit.          No Known Allergies    Objective:  Visit Vitals    /80 (BP 1 Location: Left arm, BP Patient Position: Sitting)    Pulse 79    Temp 98.5 °F (36.9 °C) (Oral)    Resp 20    Ht 5' 3.25\" (1.607 m)    Wt 201 lb (91.2 kg)  LMP 02/13/2018    SpO2 97%    BMI 35.32 kg/m2      Constitutional: Well developed, nourished, no distress, alert, obese habitus   HENT: Exterior ears and tympanic membranes normal bilaterally. Supple neck. No thyromegaly or lymphadenopathy. Oropharynx clear and moist mucous membranes. Eyes: Conjunctiva normal. PERRL. CV: S1, S2.  RRR. No murmurs/rubs. No thrills palpated. No carotid bruits. Intact distal pulses. No edema. Pulm: No abnormalities on inspection. Clear to auscultation bilaterally. No wheezing/rhonchi. Normal effort. GI: Soft, nontender, nondistended. Normal active bowel sounds. MS: Gait normal.  Joints without deformity/tenderness. Strength intact bilateral upper and lower ext. Normal ROM all extremities. Neuro: A/O x 3. No focal motor or sensory deficits. Speech normal.   Skin: No lesions/rashes on inspection. Psych: Appropriate affect, judgement and insight. Short-term memory intact. Labwork and Ancillary Studies:    CBC w/Diff  Lab Results   Component Value Date/Time    WBC 5.5 08/01/2017 08:22 AM    HGB 11.6 (L) 08/01/2017 08:22 AM    PLATELET 748 50/60/5690 08:22 AM         Basic Metabolic Profile/LFTs  Lab Results   Component Value Date/Time    Sodium 142 08/01/2017 08:22 AM    Potassium 4.4 08/01/2017 08:22 AM    Chloride 108 08/01/2017 08:22 AM    CO2 25 08/01/2017 08:22 AM    Anion gap 9 08/01/2017 08:22 AM    Glucose 106 (H) 08/01/2017 08:22 AM    BUN 15 08/01/2017 08:22 AM    Creatinine 0.88 08/01/2017 08:22 AM    BUN/Creatinine ratio 17 08/01/2017 08:22 AM    GFR est AA >60 08/01/2017 08:22 AM    GFR est non-AA >60 08/01/2017 08:22 AM    Calcium 8.9 08/01/2017 08:22 AM      Lab Results   Component Value Date/Time    ALT (SGPT) <6 (L) 08/01/2017 08:22 AM    AST (SGOT) 18 08/01/2017 08:22 AM    Alk.  phosphatase 84 08/01/2017 08:22 AM    Bilirubin, total 0.4 08/01/2017 08:22 AM       Cholesterol  Lab Results   Component Value Date/Time Cholesterol, total 195 08/01/2017 08:22 AM    HDL Cholesterol 50 08/01/2017 08:22 AM    LDL, calculated 112.2 (H) 08/01/2017 08:22 AM    Triglyceride 164 (H) 08/01/2017 08:22 AM    CHOL/HDL Ratio 3.9 08/01/2017 08:22 AM

## 2018-02-15 NOTE — MR AVS SNAPSHOT
[address] [place] [address]  [id number] [facility] [id number] [phone] Patient: Saintclair Camps MRN: [id number] :1975 Visit Information Date & Time Provider Department Dept. Phone Encounter #  
 2/15/2018  3:00 PM Jailene Syed, [facility] [place] [phone] [id number] Upcoming Health Maintenance Date Due  
 PAP AKA CERVICAL CYTOLOGY 2019 DTaP/Tdap/Td series (2 - Td) 2020 Allergies as of 2/15/2018  Review Complete On: 2/15/2018 By: Jailene Syed MD  
 No Known Allergies Current Immunizations  Reviewed on 2017 Name Date Influenza Vaccine 10/2/2014, 10/25/2013 Influenza Vaccine (Quad) PF 2017  3:46 PM, 2016  4:13 PM  
  
 Not reviewed this visit You Were Diagnosed With   
  
 Codes Comments Routine general medical examination at a health care facility    -  Primary ICD-10-CM: Z00.00 ICD-9-CM: V70.0 Vitamin D deficiency     ICD-10-CM: E55.9 ICD-9-CM: 268.9 Elevated blood sugar     ICD-10-CM: R73.9 ICD-9-CM: 790.29 Female pattern baldness     ICD-10-CM: L65.8 ICD-9-CM: 704.09 Class 2 obesity due to excess calories with serious comorbidity and body mass index (BMI) of 35.0 to 35.9 in adult     ICD-10-CM: E66.09, Z68.35 ICD-9-CM: 278.00, V85.35 Vitals BP Pulse Temp Resp Height(growth percentile) Weight(growth percentile) 108/80 (BP 1 Location: Left arm, BP Patient Position: Sitting) 79 98.5 °F (36.9 °C) (Oral) 20 5' 3.25\" (1.607 m) 201 lb (91.2 kg) LMP SpO2 BMI OB Status Smoking Status 2018 97% 35.32 kg/m2 Having regular periods Former Smoker Vitals History BMI and BSA Data Body Mass Index Body Surface Area  
 35.32 kg/m 2 2.02 m 2 Preferred Pharmacy Pharmacy Name Phone  [facility] [phone] Your Updated Medication List  
  
   
 This list is accurate as of: 2/15/18  3:13 PM.  Always use your most recent med list.  
  
  
  
  
 ALPRAZolam 0.25 mg tablet Commonly known as:  XANAX  
TAKE 1 TABLET BY MOUTH TWICE DAILY AS NEEDED FOR ANXIETY. MAXIMUM AMOUNT IS 2 TABLETS A DAY  
  
 ergocalciferol 50,000 unit capsule Commonly known as:  VITAMIN D2 Take 1 Cap by mouth every Monday and Thursday for 24 doses. finasteride 1 mg tablet Commonly known as:  Jericho Naas Take 1 Tab by mouth daily. spironolactone 25 mg tablet Commonly known as:  ALDACTONE Take 1 Tab by mouth daily. traZODone 150 mg tablet Commonly known as:  Dudley Rasher Take 1 Tab by mouth nightly as needed for Sleep. Prescriptions Sent to Pharmacy Refills  
 ergocalciferol (VITAMIN D2) 50,000 unit capsule 0 Sig: Take 1 Cap by mouth every Monday and Thursday for 24 doses. Class: Normal  
 Pharmacy: [facility] [address] Ph #: [phone] Route: Oral  
 finasteride (PROPECIA) 1 mg tablet 0 Sig: Take 1 Tab by mouth daily. Class: Normal  
 Pharmacy: [facility] [address] Ph #: [phone] Route: Oral  
  
To-Do List   
 02/15/2018 Lab:  CBC W/O DIFF   
  
 02/15/2018 Lab:  HEMOGLOBIN A1C W/O EAG   
  
 02/15/2018 Lab:  LIPID PANEL   
  
 02/15/2018 Lab:  METABOLIC PANEL, COMPREHENSIVE Patient Instructions Finasteride (By mouth) Finasteride (fin-AS-ter-jack) Treats benign prostatic hyperplasia (enlarged prostate). Also treats hair loss in men. Brand Name(s): Propecia, Proscar There may be other brand names for this medicine. When This Medicine Should Not Be Used: You should not use this medicine if you have had an allergic reaction to finasteride. Women and children should not use this medicine. How to Use This Medicine:  
Tablet · Your doctor will tell you how much medicine to use. Do not use more than directed. · You may take this medicine with or without food. · Take this medicine at the same time each day. · For hair loss, you may need to take this medicine for 3 months or longer before you see an effect. · For an enlarged prostate, you may need to take this medicine for up to 6 months to see the full effect. · Read and follow the patient instructions that come with this medicine. Talk to your doctor or pharmacist if you have any questions. If a dose is missed: · If you miss a dose or forget to take your medicine, skip the missed dose and take your next regular dose. Do not use extra medicine to make up for a missed dose. How to Store and Dispose of This Medicine: · Store the medicine in a closed container at room temperature, away from heat, moisture, and direct light. · Ask your pharmacist, doctor, or health caregiver about the best way to dispose of any outdated medicine or medicine no longer needed. · Keep all medicine out of the reach of children. Never share your medicine with anyone. Drugs and Foods to Avoid: Ask your doctor or pharmacist before using any other medicine, including over-the-counter medicines, vitamins, and herbal products. Warnings While Using This Medicine: · Women who are pregnant or may become pregnant should avoid handling or touching this medicine. This medicine can get into the body through the skin and may harm an unborn male baby. If any of this medicine gets on the skin of a woman, wash the area immediately with soap and water. · Make sure your doctor knows if you have liver disease or severe problems urinating. · This medicine will not cure an enlarged prostate problem or male pattern baldness. Once you stop using this medicine, the prostate will begin to grow again within a few months and any new hair will be lost within 12 months. · This medicine may cause changes to the breast tissue.  Tell your doctor if you have any lumps, pain, tenderness, or an enlargement of the breasts while using this medicine. · This medicine will not prevent prostate cancer but may increase your risk of developing high-grade prostate cancer. Tell your doctor if you have concerns about this risk. · This medicine may affect the results of the prostate specific antigen (PSA) test, which may be used to detect prostate cancer. Make sure you tell all of your doctors that you are using this medicine. · This medicine may cause a decrease in the amount of semen you ejaculate during sex. This will not affect your sperm count or your ability to have children. · Your doctor will check your progress and the effects of this medicine at regular visits. Keep all appointments. Blood tests may be needed to check for unwanted effects. Possible Side Effects While Using This Medicine:  
Call your doctor right away if you notice any of these side effects: · Allergic reaction: Itching or hives, swelling in your face or hands, swelling or tingling in your mouth or throat, chest tightness, trouble breathing · Breast swelling, pain, or tenderness. · Lightheadedness, dizziness, or fainting. · Lumps in the breast or under the arm. · Pain in the testicles. · Swelling in your hands, ankles, or feet. If you notice these less serious side effects, talk with your doctor: · Decrease in the amount of semen. · Loss of interest in sex or the ability to have sex (impotence). · Skin rash. · Sleepiness or unusual drowsiness. · Stuffy or runny nose. · Trouble having or keeping an erection. · Weakness. If you notice other side effects that you think are caused by this medicine, tell your doctor. Call your doctor for medical advice about side effects. You may report side effects to FDA at 1-915-FDA-6648 © 2017 [address] Information is for End User's use only and may not be sold, redistributed or otherwise used for commercial purposes. The above information is an  only. It is not intended as medical advice for individual conditions or treatments. Talk to your doctor, nurse or pharmacist before following any medical regimen to see if it is safe and effective for you. Introducing [facility]! Dear Alesia Uribe: Thank you for requesting a [employer] account. Our records indicate that you already have an active [employer] account. You can access your account anytime at [email or web address] Did you know that you can access your hospital and ER discharge instructions at any time in [employer]? You can also review all of your test results from your hospital stay or ER visit. Additional Information If you have questions, please visit the Frequently Asked Questions section of the [employer] website at [email or web address] Remember, [employer] is NOT to be used for urgent needs. For medical emergencies, dial 911. Now available from your iPhone and Android! Please provide this summary of care documentation to your next provider. Your primary care clinician is listed as Deysi Phipps. If you have any questions after today's visit, please call [phone].

## 2018-02-19 ENCOUNTER — TELEPHONE (OUTPATIENT)
Dept: FAMILY MEDICINE CLINIC | Age: 43
End: 2018-02-19

## 2018-02-19 DIAGNOSIS — L65.8 FEMALE PATTERN BALDNESS: ICD-10-CM

## 2018-02-19 DIAGNOSIS — E55.9 VITAMIN D DEFICIENCY: ICD-10-CM

## 2018-02-19 DIAGNOSIS — F41.9 ANXIETY: ICD-10-CM

## 2018-02-19 RX ORDER — FINASTERIDE 1 MG/1
1 TABLET, FILM COATED ORAL DAILY
Qty: 90 TAB | Refills: 0 | Status: SHIPPED | OUTPATIENT
Start: 2018-02-19 | End: 2018-02-20 | Stop reason: SDUPTHER

## 2018-02-19 RX ORDER — ERGOCALCIFEROL 1.25 MG/1
50000 CAPSULE ORAL
Qty: 12 CAP | Refills: 0 | Status: SHIPPED | OUTPATIENT
Start: 2018-02-19 | End: 2018-05-15 | Stop reason: SDUPTHER

## 2018-02-19 NOTE — TELEPHONE ENCOUNTER
Received notice that [facility] is no longer the mail order provider. Called pt, left msg for her to call and update us which is the correct mail order provider. My chart msg to pt as well.

## 2018-02-20 DIAGNOSIS — L65.8 FEMALE PATTERN BALDNESS: ICD-10-CM

## 2018-02-20 RX ORDER — FINASTERIDE 1 MG/1
1 TABLET, FILM COATED ORAL DAILY
Qty: 90 TAB | Refills: 0 | Status: SHIPPED | OUTPATIENT
Start: 2018-02-20 | End: 2018-05-15 | Stop reason: SDUPTHER

## 2018-03-12 DIAGNOSIS — L65.8 FEMALE PATTERN BALDNESS: ICD-10-CM

## 2018-03-12 RX ORDER — SPIRONOLACTONE 25 MG/1
25 TABLET ORAL DAILY
Qty: 90 TAB | Refills: 1 | Status: SHIPPED | OUTPATIENT
Start: 2018-03-12 | End: 2018-06-15 | Stop reason: SDUPTHER

## 2018-04-04 ENCOUNTER — HOSPITAL ENCOUNTER (OUTPATIENT)
Dept: LAB | Age: 43
Discharge: HOME | End: 2018-04-04
Payer: COMMERCIAL

## 2018-04-04 DIAGNOSIS — Z00.00 ROUTINE GENERAL MEDICAL EXAMINATION AT A HEALTH CARE FACILITY: ICD-10-CM

## 2018-04-04 DIAGNOSIS — R73.9 ELEVATED BLOOD SUGAR: ICD-10-CM

## 2018-04-04 LAB
ALBUMIN SERPL-MCNC: 4 G/DL (ref 3.4–5)
ALBUMIN/GLOB SERPL: 1.3 {RATIO} (ref 0.8–1.7)
ALP SERPL-CCNC: 76 U/L (ref 45–117)
ALT SERPL-CCNC: 7 U/L (ref 13–56)
ANION GAP SERPL CALC-SCNC: 6 MMOL/L (ref 3–18)
AST SERPL-CCNC: 15 U/L (ref 15–37)
BILIRUB SERPL-MCNC: 0.4 MG/DL (ref 0.2–1)
BUN SERPL-MCNC: 18 MG/DL (ref 7–18)
BUN/CREAT SERPL: 19 (ref 12–20)
CALCIUM SERPL-MCNC: 8.8 MG/DL (ref 8.5–10.1)
CHLORIDE SERPL-SCNC: 107 MMOL/L (ref 100–108)
CHOLEST SERPL-MCNC: 216 MG/DL
CO2 SERPL-SCNC: 25 MMOL/L (ref 21–32)
CREAT SERPL-MCNC: 0.95 MG/DL (ref 0.6–1.3)
ERYTHROCYTE [DISTWIDTH] IN BLOOD BY AUTOMATED COUNT: 13.8 % (ref 11.6–14.5)
GLOBULIN SER CALC-MCNC: 3.1 G/DL (ref 2–4)
GLUCOSE SERPL-MCNC: 93 MG/DL (ref 74–99)
HBA1C MFR BLD: 5.1 % (ref 4.2–5.6)
HCT VFR BLD AUTO: 38.4 % (ref 35–45)
HDLC SERPL-MCNC: 54 MG/DL (ref 40–60)
HDLC SERPL: 4 {RATIO} (ref 0–5)
HGB BLD-MCNC: 12.2 G/DL (ref 12–16)
LDLC SERPL CALC-MCNC: 137 MG/DL (ref 0–100)
LIPID PROFILE,FLP: ABNORMAL
MCH RBC QN AUTO: 29 PG (ref 24–34)
MCHC RBC AUTO-ENTMCNC: 31.8 G/DL (ref 31–37)
MCV RBC AUTO: 91.2 FL (ref 74–97)
PLATELET # BLD AUTO: 310 K/UL (ref 135–420)
PMV BLD AUTO: 8.7 FL (ref 9.2–11.8)
POTASSIUM SERPL-SCNC: 4.6 MMOL/L (ref 3.5–5.5)
PROT SERPL-MCNC: 7.1 G/DL (ref 6.4–8.2)
RBC # BLD AUTO: 4.21 M/UL (ref 4.2–5.3)
SODIUM SERPL-SCNC: 138 MMOL/L (ref 136–145)
TRIGL SERPL-MCNC: 125 MG/DL (ref ?–150)
VLDLC SERPL CALC-MCNC: 25 MG/DL
WBC # BLD AUTO: 6.2 K/UL (ref 4.6–13.2)

## 2018-04-04 PROCEDURE — 80053 COMPREHEN METABOLIC PANEL: CPT | Performed by: INTERNAL MEDICINE

## 2018-04-04 PROCEDURE — 83036 HEMOGLOBIN GLYCOSYLATED A1C: CPT | Performed by: INTERNAL MEDICINE

## 2018-04-04 PROCEDURE — 85027 COMPLETE CBC AUTOMATED: CPT | Performed by: INTERNAL MEDICINE

## 2018-04-04 PROCEDURE — 80061 LIPID PANEL: CPT | Performed by: INTERNAL MEDICINE

## 2018-04-04 PROCEDURE — 36415 COLL VENOUS BLD VENIPUNCTURE: CPT | Performed by: INTERNAL MEDICINE

## 2018-04-09 ENCOUNTER — OFFICE VISIT (OUTPATIENT)
Dept: FAMILY MEDICINE CLINIC | Age: 43
End: 2018-04-09

## 2018-04-09 VITALS
SYSTOLIC BLOOD PRESSURE: 110 MMHG | WEIGHT: 201 LBS | TEMPERATURE: 98.5 F | HEIGHT: 63 IN | OXYGEN SATURATION: 98 % | DIASTOLIC BLOOD PRESSURE: 78 MMHG | BODY MASS INDEX: 35.61 KG/M2 | HEART RATE: 89 BPM | RESPIRATION RATE: 20 BRPM

## 2018-04-09 DIAGNOSIS — K58.0 IRRITABLE BOWEL SYNDROME WITH DIARRHEA: ICD-10-CM

## 2018-04-09 DIAGNOSIS — L65.8 FEMALE PATTERN BALDNESS: Primary | ICD-10-CM

## 2018-04-09 DIAGNOSIS — E55.9 VITAMIN D DEFICIENCY: ICD-10-CM

## 2018-04-09 NOTE — PROGRESS NOTES
Lucio Shrestha is a 38 y.o. female (: 1975) presenting to address:    Chief Complaint   Patient presents with    Vitamin D Deficiency    Labs     Consult lab  results       Vitals:    18 1506   BP: 110/78   Pulse: 89   Resp: 20   Temp: 98.5 °F (36.9 °C)   TempSrc: Oral   SpO2: 98%   Weight: 201 lb (91.2 kg)   Height: 5' 3.25\" (1.607 m)   PainSc:   0 - No pain   LMP: 2018       Learning Assessment:     Learning Assessment 3/18/2015   PRIMARY LEARNER Patient   HIGHEST LEVEL OF EDUCATION - PRIMARY LEARNER  -   BARRIERS PRIMARY LEARNER -   CO-LEARNER CAREGIVER -   PRIMARY LANGUAGE ENGLISH   LEARNER PREFERENCE PRIMARY DEMONSTRATION   ANSWERED BY Patient   RELATIONSHIP SELF     Depression Screening:     PHQ over the last two weeks 2/15/2018   Little interest or pleasure in doing things Not at all   Feeling down, depressed or hopeless Not at all   Total Score PHQ 2 0     Fall Risk Assessment:     Fall Risk Assessment, last 12 mths 2/15/2018   Able to walk? Yes   Fall in past 12 months? No     Abuse Screening:     Abuse Screening Questionnaire 2017   Do you ever feel afraid of your partner? N   Are you in a relationship with someone who physically or mentally threatens you? N   Is it safe for you to go home? Y     Coordination of Care Questionaire:   1. Have you been to the ER, urgent care clinic since your last visit? Hospitalized since your last visit? NO    2. Have you seen or consulted any other health care providers outside of the [facility] since your last visit? Include any pap smears or colon screening. NO    Advanced Directive:   1. Do you have an Advanced Directive? YES    2. Would you like information on Advanced Directives?  NO

## 2018-04-09 NOTE — PROGRESS NOTES
Assessment/Plan:    1. Female pattern baldness  -cont propecia for a little longer to see if it helps. 2. Vitamin D deficiency  -nova vit d when taking it twice weekly  - VITAMIN D, 25 HYDROXY; Future  - VITAMIN D, 25 HYDROXY; Future    3. Irritable bowel syndrome with diarrhea  -FODMAP diet. Consider elavil. The plan was discussed with the patient. The patient verbalized understanding and is in agreement with the plan. All medication potential side effects were discussed with the patient. Health Maintenance:   Health Maintenance   Topic Date Due    PAP AKA CERVICAL CYTOLOGY  01/01/2019    DTaP/Tdap/Td series (2 - Td) 01/01/2020    Influenza Age 5 to Adult  Completed       Omar Perez is a 38 y.o. female and presents with Vitamin D Deficiency and Labs (Consult lab  results)     Subjective:  Female baldness- propecia added at last visit. She feels the hair loss has slowed and has some new hair growth. Vit d- dose was incr to twice weekly dosing. She is taking fish oil at the recommendation of ophthalmologist.    She is also taking a probiotic to help with stomach pain, w/o improvement x 1 mo. Pt has diarrhea and cramping/bloating, intermittently, happens about once weekly. On days that it's flared, she has diarrhea (4-5/day). She states she has a \"nervous sx\". Can't identify food triggers. ROS:  Constitutional: No recent weight change. No weakness/fatigue. No f/c. Cardiovascular: No CP/palpitations. No CRISTOBAL/orthopnea/PND. Respiratory: No cough/sputum, dyspnea, wheezing. Gastointestinal: No dysphagia, reflux. No n/v. No constipation/diarrhea. No melena/rectal bleeding. The problem list was updated as a part of today's visit. Patient Active Problem List   Diagnosis Code    Anxiety F41.9    Vitamin D deficiency E55.9    Obesity (BMI 30-39. 9) E66.9    Irregular menstrual cycle N92.6    Menorrhagia N92.0    Depression F32.9    Psychophysiological insomnia F51.04  Elevated blood sugar R73.9    Female pattern baldness L65.8       The PSH, FH were reviewed. SH:  Social History   Substance Use Topics    Smoking status: Former Smoker     Types: Cigarettes     Start date: 3/18/1989     Quit date: 3/18/1999    Smokeless tobacco: Never Used    Alcohol use 0.5 oz/week     1 Standard drinks or equivalent per week      Comment: < 1/week       Medications/Allergies:  Current Outpatient Prescriptions on File Prior to Visit   Medication Sig Dispense Refill    spironolactone (ALDACTONE) 25 mg tablet Take 1 Tab by mouth daily. 90 Tab 1    finasteride (PROPECIA) 1 mg tablet Take 1 Tab by mouth daily. 90 Tab 0    ergocalciferol (VITAMIN D2) 50,000 unit capsule Take 1 Cap by mouth every Monday and Thursday for 24 doses. 12 Cap 0    ALPRAZolam (XANAX) 0.25 mg tablet TAKE 1 TABLET BY MOUTH TWICE DAILY AS NEEDED FOR ANXIETY. MAXIMUM AMOUNT IS 2 TABLETS A DAY 30 Tab 0    traZODone (DESYREL) 150 mg tablet Take 1 Tab by mouth nightly as needed for Sleep. 90 Tab 1     No current facility-administered medications on file prior to visit. No Known Allergies    Objective:  Visit Vitals    /78 (BP 1 Location: Right arm, BP Patient Position: Sitting)    Pulse 89    Temp 98.5 °F (36.9 °C) (Oral)    Resp 20    Ht 5' 3.25\" (1.607 m)    Wt 201 lb (91.2 kg)    LMP 04/07/2018    SpO2 98%    BMI 35.32 kg/m2      Constitutional: Well developed, nourished, no distress, alert, obese habitus   CV: S1, S2.  RRR. No murmurs/rubs. No thrills palpated. No carotid bruits. Intact distal pulses. No edema. Pulm: No abnormalities on inspection. Clear to auscultation bilaterally. No wheezing/rhonchi. Normal effort. GI: Soft, nontender, nondistended. Normal active bowel sounds. Psych: Appropriate affect, judgement and insight. Short-term memory intact.        Labwork and Ancillary Studies:    CBC w/Diff  Lab Results   Component Value Date/Time    WBC 6.2 04/04/2018 08:14 AM    HGB 12.2 04/04/2018 08:14 AM    PLATELET 926 54/17/3580 08:14 AM         Basic Metabolic Profile/LFTs  Lab Results   Component Value Date/Time    Sodium 138 04/04/2018 08:14 AM    Potassium 4.6 04/04/2018 08:14 AM    Chloride 107 04/04/2018 08:14 AM    CO2 25 04/04/2018 08:14 AM    Anion gap 6 04/04/2018 08:14 AM    Glucose 93 04/04/2018 08:14 AM    BUN 18 04/04/2018 08:14 AM    Creatinine 0.95 04/04/2018 08:14 AM    BUN/Creatinine ratio 19 04/04/2018 08:14 AM    GFR est AA >60 04/04/2018 08:14 AM    GFR est non-AA >60 04/04/2018 08:14 AM    Calcium 8.8 04/04/2018 08:14 AM      Lab Results   Component Value Date/Time    ALT (SGPT) 7 (L) 04/04/2018 08:14 AM    AST (SGOT) 15 04/04/2018 08:14 AM    Alk.  phosphatase 76 04/04/2018 08:14 AM    Bilirubin, total 0.4 04/04/2018 08:14 AM       Cholesterol  Lab Results   Component Value Date/Time    Cholesterol, total 216 (H) 04/04/2018 08:14 AM    HDL Cholesterol 54 04/04/2018 08:14 AM    LDL, calculated 137 (H) 04/04/2018 08:14 AM    Triglyceride 125 04/04/2018 08:14 AM    CHOL/HDL Ratio 4.0 04/04/2018 08:14 AM

## 2018-04-09 NOTE — MR AVS SNAPSHOT
[address]  [address] [place] [id number] [phone] Patient: Dino Fu MRN: [id number] :1975 Visit Information Date & Time Provider Department Dept. Phone Encounter #  
 2018  3:15 PM Shane Wolf, [facility] [phone] Upcoming Health Maintenance Date Due  
 PAP AKA CERVICAL CYTOLOGY 2019 DTaP/Tdap/Td series (2 - Td) 2020 Allergies as of 2018  Review Complete On: 2018 By: Shane Wolf MD  
 No Known Allergies Current Immunizations  Reviewed on 2017 Name Date Influenza Vaccine 10/2/2014, 10/25/2013 Influenza Vaccine (Quad) PF 2017  3:46 PM, 2016  4:13 PM  
  
 Not reviewed this visit You Were Diagnosed With   
  
 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  
 35.32 kg/m 2 2.02 m 2 Preferred Pharmacy Pharmacy Name Phone [facility], [address] [phone] Your Updated Medication List  
  
   
This list is accurate as of 18  3:19 PM.  Always use your most recent med list.  
  
  
  
  
 ALPRAZolam 0.25 mg tablet Commonly known as:  XANAX  
TAKE 1 TABLET BY MOUTH TWICE DAILY AS NEEDED FOR ANXIETY.  MAXIMUM AMOUNT IS 2 TABLETS A DAY  
  
 ergocalciferol 50,000 unit capsule Commonly known as:  VITAMIN D2 Take 1 Cap by mouth every Monday and Thursday for 24 doses. finasteride 1 mg tablet Commonly known as:  Quenten Purl Take 1 Tab by mouth daily. spironolactone 25 mg tablet Commonly known as:  ALDACTONE Take 1 Tab by mouth daily. traZODone 150 mg tablet Commonly known as:  Cristobal Bibles Take 1 Tab by mouth nightly as needed for Sleep. To-Do List   
 04/09/2018 Lab:  VITAMIN D, 25 HYDROXY   
  
 04/09/2018 Lab:  VITAMIN D, 25 HYDROXY Introducing [facility] SERVICES! Dear Gabrielle Whitehead: Thank you for requesting a [employer] account. Our records indicate that you already have an active [employer] account. You can access your account anytime at [email or web address] Did you know that you can access your hospital and ER discharge instructions at any time in [employer]? You can also review all of your test results from your hospital stay or ER visit. Additional Information If you have questions, please visit the Frequently Asked Questions section of the [employer] website at [email or web address] Remember, [employer] is NOT to be used for urgent needs. For medical emergencies, dial 911. Now available from your iPhone and Android! Please provide this summary of care documentation to your next provider. Your primary care clinician is listed as Deysi Phipps. If you have any questions after today's visit, please call [phone].

## 2018-04-10 DIAGNOSIS — F51.04 PSYCHOPHYSIOLOGICAL INSOMNIA: ICD-10-CM

## 2018-04-11 RX ORDER — TRAZODONE HYDROCHLORIDE 150 MG/1
150 TABLET ORAL
Qty: 90 TAB | Refills: 1 | Status: SHIPPED | OUTPATIENT
Start: 2018-04-11 | End: 2018-12-06 | Stop reason: SDUPTHER

## 2018-04-11 NOTE — TELEPHONE ENCOUNTER
From: Ruben Shea  To: Bryan Best MD  Sent: 4/10/2018 9:48 PM EDT  Subject: Medication Renewal Request    Original authorizing provider: MD Ruben Rahman would like a refill of the following medications:  traZODone (DESYREL) 150 mg tablet Bryan Best MD]    Preferred pharmacy: [address] [place] [address]. PHARM.(SPECIALTY) - [facility] - [facility]    Comment:

## 2018-05-08 ENCOUNTER — HOSPITAL ENCOUNTER (OUTPATIENT)
Dept: LAB | Age: 43
Discharge: HOME | End: 2018-05-08
Payer: COMMERCIAL

## 2018-05-08 DIAGNOSIS — E55.9 VITAMIN D DEFICIENCY: ICD-10-CM

## 2018-05-08 PROCEDURE — 36415 COLL VENOUS BLD VENIPUNCTURE: CPT | Performed by: INTERNAL MEDICINE

## 2018-05-08 PROCEDURE — 82306 VITAMIN D 25 HYDROXY: CPT | Performed by: INTERNAL MEDICINE

## 2018-05-09 LAB — 25(OH)D3 SERPL-MCNC: 56.3 NG/ML (ref 30–100)

## 2018-05-15 DIAGNOSIS — E55.9 VITAMIN D DEFICIENCY: ICD-10-CM

## 2018-05-15 DIAGNOSIS — L65.8 FEMALE PATTERN BALDNESS: ICD-10-CM

## 2018-05-15 RX ORDER — FINASTERIDE 1 MG/1
TABLET, FILM COATED ORAL
Qty: 90 TAB | Refills: 0 | Status: SHIPPED | OUTPATIENT
Start: 2018-05-15 | End: 2018-08-13 | Stop reason: SDUPTHER

## 2018-05-15 RX ORDER — ERGOCALCIFEROL 1.25 MG/1
50000 CAPSULE ORAL
Qty: 12 CAP | Refills: 0 | Status: SHIPPED | OUTPATIENT
Start: 2018-05-17 | End: 2018-05-17 | Stop reason: SDUPTHER

## 2018-05-15 NOTE — TELEPHONE ENCOUNTER
From: Aldo Angel  To: Giles Valderrama MD  Sent: 5/15/2018 11:58 AM EDT  Subject: Medication Renewal Request    Original authorizing provider: MD Aldo Brown would like a refill of the following medications:  ergocalciferol (VITAMIN D2) 50,000 unit capsule Giles Valderrama MD]    Preferred pharmacy: [address] [place] [address]. PHARM.(SPECIALTY) - [facility] - [address]    Comment:

## 2018-05-17 DIAGNOSIS — E55.9 VITAMIN D DEFICIENCY: ICD-10-CM

## 2018-05-17 RX ORDER — ERGOCALCIFEROL 1.25 MG/1
50000 CAPSULE ORAL
Qty: 48 CAP | Refills: 0 | Status: SHIPPED | OUTPATIENT
Start: 2018-05-17 | End: 2018-08-07

## 2018-06-15 DIAGNOSIS — L65.8 FEMALE PATTERN BALDNESS: ICD-10-CM

## 2018-06-18 RX ORDER — SPIRONOLACTONE 25 MG/1
25 TABLET ORAL DAILY
Qty: 90 TAB | Refills: 1 | Status: SHIPPED | OUTPATIENT
Start: 2018-06-18 | End: 2018-08-26 | Stop reason: SDUPTHER

## 2018-06-18 NOTE — TELEPHONE ENCOUNTER
From: Raven Orellana  To: Jose Mart MD  Sent: 6/15/2018 9:30 PM EDT  Subject: Medication Renewal Request    Original authorizing provider: MD Raven Davis would like a refill of the following medications:  spironolactone (ALDACTONE) 25 mg tablet Jose Mart MD]    Preferred pharmacy: [address] [place] [address]    Comment:

## 2018-08-13 DIAGNOSIS — L65.8 FEMALE PATTERN BALDNESS: ICD-10-CM

## 2018-08-13 RX ORDER — FINASTERIDE 1 MG/1
TABLET, FILM COATED ORAL
Qty: 90 TAB | Refills: 0 | Status: SHIPPED | OUTPATIENT
Start: 2018-08-13 | End: 2018-10-26 | Stop reason: SDUPTHER

## 2018-08-26 DIAGNOSIS — L65.8 FEMALE PATTERN BALDNESS: ICD-10-CM

## 2018-08-27 RX ORDER — SPIRONOLACTONE 25 MG/1
25 TABLET ORAL DAILY
Qty: 90 TAB | Refills: 1 | Status: SHIPPED | OUTPATIENT
Start: 2018-08-27 | End: 2019-02-27 | Stop reason: SDUPTHER

## 2018-08-27 NOTE — TELEPHONE ENCOUNTER
From: Bella Short  To: Alen Olvera MD  Sent: 8/26/2018 11:51 AM EDT  Subject: Medication Renewal Request    Original authorizing provider: MD Bella Rust would like a refill of the following medications:  spironolactone (ALDACTONE) 25 mg tablet Alen Olvera MD]    Preferred pharmacy: [address] [place] [address]    Comment:

## 2018-10-03 DIAGNOSIS — F41.9 ANXIETY: ICD-10-CM

## 2018-10-04 RX ORDER — ALPRAZOLAM 0.25 MG/1
0.25 TABLET ORAL
Qty: 30 TAB | Refills: 0 | Status: SHIPPED | OUTPATIENT
Start: 2018-10-04 | End: 2019-09-04 | Stop reason: SDUPTHER

## 2018-10-04 NOTE — TELEPHONE ENCOUNTER
From: Elvin Jerome  To: Ellie Salazar MD  Sent: 10/3/2018 9:11 PM EDT  Subject: Medication Renewal Request    Original authorizing provider: MD Elvin Pino would like a refill of the following medications:  ALPRAZolam (XANAX) 0.25 mg tablet Ellie Salazar MD]    Preferred pharmacy: [address] [place] [address]    Comment:

## 2018-10-26 DIAGNOSIS — L65.8 FEMALE PATTERN BALDNESS: ICD-10-CM

## 2018-10-29 RX ORDER — FINASTERIDE 1 MG/1
TABLET, FILM COATED ORAL
Qty: 90 TAB | Refills: 0 | Status: SHIPPED | OUTPATIENT
Start: 2018-10-29 | End: 2019-02-22 | Stop reason: SDUPTHER

## 2018-12-06 DIAGNOSIS — F51.04 PSYCHOPHYSIOLOGICAL INSOMNIA: ICD-10-CM

## 2018-12-07 RX ORDER — TRAZODONE HYDROCHLORIDE 150 MG/1
150 TABLET ORAL
Qty: 90 TAB | Refills: 1 | Status: SHIPPED | OUTPATIENT
Start: 2018-12-07 | End: 2020-01-29

## 2019-01-09 ENCOUNTER — OFFICE VISIT (OUTPATIENT)
Dept: FAMILY MEDICINE CLINIC | Age: 44
End: 2019-01-09

## 2019-01-09 VITALS
SYSTOLIC BLOOD PRESSURE: 116 MMHG | HEIGHT: 63 IN | TEMPERATURE: 99.3 F | HEART RATE: 106 BPM | BODY MASS INDEX: 38.52 KG/M2 | OXYGEN SATURATION: 97 % | DIASTOLIC BLOOD PRESSURE: 80 MMHG | WEIGHT: 217.4 LBS | RESPIRATION RATE: 18 BRPM

## 2019-01-09 DIAGNOSIS — R05.9 COUGH: Primary | ICD-10-CM

## 2019-01-09 RX ORDER — CODEINE PHOSPHATE AND GUAIFENESIN 10; 100 MG/5ML; MG/5ML
5 SOLUTION ORAL
Qty: 35 ML | Refills: 0 | Status: SHIPPED | OUTPATIENT
Start: 2019-01-09 | End: 2019-04-30 | Stop reason: ALTCHOICE

## 2019-01-09 RX ORDER — AZITHROMYCIN 250 MG/1
TABLET, FILM COATED ORAL
Qty: 6 TAB | Refills: 0 | Status: SHIPPED | OUTPATIENT
Start: 2019-01-09 | End: 2019-01-14

## 2019-01-09 NOTE — LETTER
NOTIFICATION RETURN TO WORK / SCHOOL 
 
1/9/2019 12:05 PM 
 
Ms. Jonh Núñez 
[address] [place] [address] [address] [place] [phone] To Whom It May Concern: 
 
Jonh Núñez is currently under the care of [address] [place] [address]. She will return to work/school on: 1/10/19 If there are questions or concerns please have the patient contact our office. Sincerely, Mario Jay MD

## 2019-01-09 NOTE — PROGRESS NOTES
Aldo Angel is a 37 y.o. female (: 1975) presenting to address: Chief Complaint Patient presents with  Cold Symptoms Vitals:  
 19 1153 BP: 116/80 Pulse: (!) 106 Resp: 18 Temp: 99.3 °F (37.4 °C) TempSrc: Oral  
SpO2: 97% Weight: 217 lb 6.4 oz (98.6 kg) Height: 5' 3.25\" (1.607 m) PainSc:   3 PainLoc: Ear  
LMP: 2018 Hearing/Vision: No exam data present Learning Assessment:  
 
Learning Assessment 3/18/2015 PRIMARY LEARNER Patient HIGHEST LEVEL OF EDUCATION - PRIMARY LEARNER  -  
BARRIERS PRIMARY LEARNER -  
CO-LEARNER CAREGIVER -  
PRIMARY LANGUAGE ENGLISH  
LEARNER PREFERENCE PRIMARY DEMONSTRATION  
ANSWERED BY Patient RELATIONSHIP SELF Depression Screening: PHQ over the last two weeks 2/15/2018 Little interest or pleasure in doing things Not at all Feeling down, depressed, irritable, or hopeless Not at all Total Score PHQ 2 0 Fall Risk Assessment:  
 
Fall Risk Assessment, last 12 mths 2/15/2018 Able to walk? Yes Fall in past 12 months? No  
 
Abuse Screening:  
 
Abuse Screening Questionnaire 2017 Do you ever feel afraid of your partner? Gail Canal Are you in a relationship with someone who physically or mentally threatens you? Gail Canal Is it safe for you to go home? Collin Lowery Coordination of Care Questionaire: 1. Have you been to the ER, urgent care clinic since your last visit? Hospitalized since your last visit? YES [facility] [phone] for cough, did chest x-ray and told her it was allergies. 2. Have you seen or consulted any other health care providers outside of the [address] [place] [address] since your last visit? Include any pap smears or colon screening. NO Advanced Directive: 1. Do you have an Advanced Directive? NO 
 
2. Would you like information on Advanced Directives?  NO

## 2019-01-09 NOTE — PROGRESS NOTES
Chief Complaint Patient presents with  Cold Symptoms Assessment/Plan 1. Cough 
-given duration, will treat with zpack. Has low grade fever today. If no better in one week, will do round of prednisone as I think she has component of bronchospasm 
- guaiFENesin-codeine (ROBITUSSIN AC) 100-10 mg/5 mL solution; Take 5 mL by mouth nightly as needed for Cough. Max Daily Amount: 5 mL. Dispense: 35 mL; Refill: 0 
- azithromycin (ZITHROMAX) 250 mg tablet; Take 2 tablets today, then take 1 tablet daily  Dispense: 6 Tab; Refill: 0 The plan was discussed with the patient. The patient verbalized understanding and is in agreement with the plan. All medication potential side effects were discussed with the patient. SUBJECTIVE:  
Jonh Núñez is a 37 y.o. female who complains of cough x 1.5 mos. States she went to [facility] last week and was told it's \"allergies\". Cough is nonproductive. Her ears are itchy and full. Hasn't had URI sx. No wheezing or shortness of breath. Cough worse at night. Review of Systems - ENT ROS: negative Respiratory ROS: positive for - cough Cardiovascular ROS: no chest pain or dyspnea on exertion Gastrointestinal ROS: no abdominal pain, change in bowel habits, or black or bloody stools Musculoskeletal ROS: negative Neurological ROS: negative Physical Examination:  
Visit Vitals /80 (BP 1 Location: Right arm, BP Patient Position: Sitting) Pulse (!) 106 Temp 99.3 °F (37.4 °C) (Oral) Resp 18 Ht 5' 3.25\" (1.607 m) Wt 217 lb 6.4 oz (98.6 kg) LMP 12/24/2018 SpO2 97% BMI 38.21 kg/m² Constitutional: Well developed, nourished, no distress, alert HENT: Exterior ears and tympanic membranes normal bilaterally. Supple neck. No thyromegaly or lymphadenopathy. Oropharynx clear and moist mucous membranes. +small middle ear effusion Eyes: Conjunctiva normal. PERRL. CV: S1, S2.  Reg, tachy. No murmurs/rubs. No thrills palpated.   No carotid bruits. Intact distal pulses. No edema. Pulm: No abnormalities on inspection. Clear to auscultation bilaterally. No wheezing/rhonchi.   Normal effort.  +coughing throughout exam   
 
 
 
 
Gerry Bright MD

## 2019-02-22 DIAGNOSIS — L65.8 FEMALE PATTERN BALDNESS: ICD-10-CM

## 2019-02-22 RX ORDER — FINASTERIDE 1 MG/1
TABLET, FILM COATED ORAL
Qty: 90 TAB | Refills: 3 | Status: SHIPPED | OUTPATIENT
Start: 2019-02-22 | End: 2020-02-28

## 2019-02-27 DIAGNOSIS — L65.8 FEMALE PATTERN BALDNESS: ICD-10-CM

## 2019-02-27 RX ORDER — SPIRONOLACTONE 25 MG/1
TABLET ORAL
Qty: 90 TAB | Refills: 3 | Status: SHIPPED | OUTPATIENT
Start: 2019-02-27 | End: 2020-04-01

## 2019-03-11 DIAGNOSIS — E55.9 VITAMIN D DEFICIENCY: Primary | ICD-10-CM

## 2019-03-11 DIAGNOSIS — Z00.00 ROUTINE GENERAL MEDICAL EXAMINATION AT A HEALTH CARE FACILITY: ICD-10-CM

## 2019-03-11 DIAGNOSIS — R73.9 ELEVATED BLOOD SUGAR: ICD-10-CM

## 2019-03-14 ENCOUNTER — HOSPITAL ENCOUNTER (OUTPATIENT)
Dept: LAB | Age: 44
Discharge: HOME | End: 2019-03-14
Payer: COMMERCIAL

## 2019-03-14 DIAGNOSIS — R73.9 ELEVATED BLOOD SUGAR: ICD-10-CM

## 2019-03-14 DIAGNOSIS — E55.9 VITAMIN D DEFICIENCY: ICD-10-CM

## 2019-03-14 DIAGNOSIS — Z00.00 ROUTINE GENERAL MEDICAL EXAMINATION AT A HEALTH CARE FACILITY: ICD-10-CM

## 2019-03-14 LAB
ERYTHROCYTE [DISTWIDTH] IN BLOOD BY AUTOMATED COUNT: 13.6 % (ref 11.6–14.5)
HCT VFR BLD AUTO: 40.1 % (ref 35–45)
HGB BLD-MCNC: 12.5 G/DL (ref 12–16)
MCH RBC QN AUTO: 28.6 PG (ref 24–34)
MCHC RBC AUTO-ENTMCNC: 31.2 G/DL (ref 31–37)
MCV RBC AUTO: 91.8 FL (ref 74–97)
PLATELET # BLD AUTO: 306 K/UL (ref 135–420)
PMV BLD AUTO: 8.9 FL (ref 9.2–11.8)
RBC # BLD AUTO: 4.37 M/UL (ref 4.2–5.3)
WBC # BLD AUTO: 6.2 K/UL (ref 4.6–13.2)

## 2019-03-14 PROCEDURE — 80061 LIPID PANEL: CPT

## 2019-03-14 PROCEDURE — 36415 COLL VENOUS BLD VENIPUNCTURE: CPT

## 2019-03-14 PROCEDURE — 83036 HEMOGLOBIN GLYCOSYLATED A1C: CPT

## 2019-03-14 PROCEDURE — 82306 VITAMIN D 25 HYDROXY: CPT

## 2019-03-14 PROCEDURE — 80053 COMPREHEN METABOLIC PANEL: CPT

## 2019-03-14 PROCEDURE — 85027 COMPLETE CBC AUTOMATED: CPT

## 2019-03-15 LAB
25(OH)D3 SERPL-MCNC: 22.6 NG/ML (ref 30–100)
ALBUMIN SERPL-MCNC: 4 G/DL (ref 3.4–5)
ALBUMIN/GLOB SERPL: 1.2 {RATIO} (ref 0.8–1.7)
ALP SERPL-CCNC: 74 U/L (ref 45–117)
ALT SERPL-CCNC: <6 U/L (ref 13–56)
ANION GAP SERPL CALC-SCNC: 8 MMOL/L (ref 3–18)
AST SERPL-CCNC: 17 U/L (ref 15–37)
BILIRUB SERPL-MCNC: 0.3 MG/DL (ref 0.2–1)
BUN SERPL-MCNC: 18 MG/DL (ref 7–18)
BUN/CREAT SERPL: 17 (ref 12–20)
CALCIUM SERPL-MCNC: 9.3 MG/DL (ref 8.5–10.1)
CHLORIDE SERPL-SCNC: 110 MMOL/L (ref 100–108)
CHOLEST SERPL-MCNC: 225 MG/DL
CO2 SERPL-SCNC: 22 MMOL/L (ref 21–32)
CREAT SERPL-MCNC: 1.05 MG/DL (ref 0.6–1.3)
GLOBULIN SER CALC-MCNC: 3.3 G/DL (ref 2–4)
GLUCOSE SERPL-MCNC: 90 MG/DL (ref 74–99)
HBA1C MFR BLD: 5.3 % (ref 4.2–5.6)
HDLC SERPL-MCNC: 59 MG/DL (ref 40–60)
HDLC SERPL: 3.8 {RATIO} (ref 0–5)
LDLC SERPL CALC-MCNC: 143.4 MG/DL (ref 0–100)
LIPID PROFILE,FLP: ABNORMAL
POTASSIUM SERPL-SCNC: 4.1 MMOL/L (ref 3.5–5.5)
PROT SERPL-MCNC: 7.3 G/DL (ref 6.4–8.2)
SODIUM SERPL-SCNC: 140 MMOL/L (ref 136–145)
TRIGL SERPL-MCNC: 113 MG/DL (ref ?–150)
VLDLC SERPL CALC-MCNC: 22.6 MG/DL

## 2019-04-30 ENCOUNTER — OFFICE VISIT (OUTPATIENT)
Dept: FAMILY MEDICINE CLINIC | Age: 44
End: 2019-04-30

## 2019-04-30 VITALS
HEIGHT: 63 IN | TEMPERATURE: 99.4 F | SYSTOLIC BLOOD PRESSURE: 110 MMHG | RESPIRATION RATE: 20 BRPM | DIASTOLIC BLOOD PRESSURE: 78 MMHG | BODY MASS INDEX: 37.92 KG/M2 | OXYGEN SATURATION: 97 % | HEART RATE: 104 BPM | WEIGHT: 214 LBS

## 2019-04-30 DIAGNOSIS — E66.01 CLASS 2 SEVERE OBESITY DUE TO EXCESS CALORIES WITH SERIOUS COMORBIDITY AND BODY MASS INDEX (BMI) OF 38.0 TO 38.9 IN ADULT (HCC): ICD-10-CM

## 2019-04-30 DIAGNOSIS — E78.00 PURE HYPERCHOLESTEROLEMIA: ICD-10-CM

## 2019-04-30 DIAGNOSIS — F41.9 ANXIETY: ICD-10-CM

## 2019-04-30 DIAGNOSIS — Z00.00 ROUTINE GENERAL MEDICAL EXAMINATION AT A HEALTH CARE FACILITY: Primary | ICD-10-CM

## 2019-04-30 DIAGNOSIS — Z65.8 PSYCHOSOCIAL STRESSORS: ICD-10-CM

## 2019-04-30 DIAGNOSIS — E55.9 VITAMIN D DEFICIENCY: ICD-10-CM

## 2019-04-30 DIAGNOSIS — L65.8 FEMALE PATTERN BALDNESS: ICD-10-CM

## 2019-04-30 PROBLEM — R73.9 ELEVATED BLOOD SUGAR: Status: RESOLVED | Noted: 2017-08-04 | Resolved: 2019-04-30

## 2019-04-30 RX ORDER — CHOLECALCIFEROL TAB 125 MCG (5000 UNIT) 125 MCG
5000 TAB ORAL DAILY
COMMUNITY
Start: 2019-04-30

## 2019-04-30 RX ORDER — BUPROPION HYDROCHLORIDE 150 MG/1
150 TABLET ORAL
Qty: 30 TAB | Refills: 0 | Status: SHIPPED | OUTPATIENT
Start: 2019-04-30 | End: 2019-05-29 | Stop reason: SDUPTHER

## 2019-04-30 RX ORDER — MINOXIDIL 2 %
SOLUTION, NON-ORAL TOPICAL
Qty: 240 ML | Refills: 1 | Status: SHIPPED | OUTPATIENT
Start: 2019-04-30 | End: 2019-06-04 | Stop reason: SDUPTHER

## 2019-04-30 NOTE — PROGRESS NOTES
Lesley Garcia is a 37 y.o. female (: 1975) presenting to address: Chief Complaint Patient presents with  Physical  
 
 
Vitals:  
 19 1511 BP: 110/78 Pulse: (!) 104 Resp: 20 Temp: 99.4 °F (37.4 °C) TempSrc: Oral  
SpO2: 97% Weight: 214 lb (97.1 kg) Height: 5' 3.25\" (1.607 m) PainSc:   0 - No pain LMP: 2019 Hearing/Vision:  
 
 Visual Acuity Screening Right eye Left eye Both eyes Without correction:     
With correction: 20/25 20/25 20/20 Learning Assessment:  
 
Learning Assessment 3/18/2015 PRIMARY LEARNER Patient HIGHEST LEVEL OF EDUCATION - PRIMARY LEARNER  -  
BARRIERS PRIMARY LEARNER -  
CO-LEARNER CAREGIVER -  
PRIMARY LANGUAGE ENGLISH  
LEARNER PREFERENCE PRIMARY DEMONSTRATION  
ANSWERED BY Patient RELATIONSHIP SELF Depression Screening:  
 
3 most recent PHQ Screens 2/15/2018 Little interest or pleasure in doing things Not at all Feeling down, depressed, irritable, or hopeless Not at all Total Score PHQ 2 0 Fall Risk Assessment:  
 
Fall Risk Assessment, last 12 mths 2/15/2018 Able to walk? Yes Fall in past 12 months? No  
 
Abuse Screening:  
 
Abuse Screening Questionnaire 2017 Do you ever feel afraid of your partner? Aime Baldwin Are you in a relationship with someone who physically or mentally threatens you? Aime Baldwin Is it safe for you to go home? Ceci Patel Coordination of Care Questionaire: 1. Have you been to the ER, urgent care clinic since your last visit? Hospitalized since your last visit? NO 
 
2. Have you seen or consulted any other health care providers outside of the [address] [place] [address] since your last visit? Include any pap smears or colon screening. NO Advanced Directive: 1. Do you have an Advanced Directive? NO 
 
2. Would you like information on Advanced Directives?  NO

## 2019-04-30 NOTE — PATIENT INSTRUCTIONS
Weight loss: 
  
Watch portion sizes:  
1/2 your plate should be veggies for lunch and dinner. Carbohydrates should be no larger than the size of a tennis ball. Protein/meat should be the size of a deck of playing cards. Eat 3 meals/day Exercise - 150 minutes/week No beverages with calories Aim for losing 1lb/week Follow a 1200 calorie/day diet Learning About the [facility] Diet What is the Mediterranean diet? The Mediterranean diet is a style of eating rather than a diet plan. It features foods eaten in [place], [place], [place] and [place], and other countries along the [facility]. It emphasizes eating foods like fish, fruits, vegetables, beans, high-fiber breads and whole grains, nuts, and olive oil. This style of eating includes limited red meat, cheese, and sweets. Why choose the Mediterranean diet? A Mediterranean-style diet may improve heart health. It contains more fat than other heart-healthy diets. But the fats are mainly from nuts, unsaturated oils (such as fish oils and olive oil), and certain nut or seed oils (such as canola, soybean, or flaxseed oil). These fats may help protect the heart and blood vessels. How can you get started on the Mediterranean diet? Here are some things you can do to switch to a more Mediterranean way of eating. What to eat · Eat a variety of fruits and vegetables each day, such as grapes, blueberries, tomatoes, broccoli, peppers, figs, olives, spinach, eggplant, beans, lentils, and chickpeas. · Eat a variety of whole-grain foods each day, such as oats, brown rice, and whole wheat bread, pasta, and couscous. · Eat fish at least 2 times a week. Try tuna, salmon, mackerel, lake trout, herring, or sardines. · Eat moderate amounts of low-fat dairy products, such as milk, cheese, or yogurt. · Eat moderate amounts of poultry and eggs.  
· Choose healthy (unsaturated) fats, such as nuts, olive oil, and certain nut or seed oils like canola, soybean, and flaxseed. · Limit unhealthy (saturated) fats, such as butter, palm oil, and coconut oil. And limit fats found in animal products, such as meat and dairy products made with whole milk. Try to eat red meat only a few times a month in very small amounts. · Limit sweets and desserts to only a few times a week. This includes sugar-sweetened drinks like soda. The Mediterranean diet may also include red wine with your meal1 glass each day for women and up to 2 glasses a day for men. Tips for eating at home · Use herbs, spices, garlic, lemon zest, and citrus juice instead of salt to add flavor to foods. · Add avocado slices to your sandwich instead of cosme. · Have fish for lunch or dinner instead of red meat. Brush the fish with olive oil, and broil or grill it. · Sprinkle your salad with seeds or nuts instead of cheese. · Cook with olive or canola oil instead of butter or oils that are high in saturated fat. · Switch from 2% milk or whole milk to 1% or fat-free milk. · Dip raw vegetables in a vinaigrette dressing or hummus instead of dips made from mayonnaise or sour cream. 
· Have a piece of fruit for dessert instead of a piece of cake. Try baked apples, or have some dried fruit. Tips for eating out · Try broiled, grilled, baked, or poached fish instead of having it fried or breaded. · Ask your  to have your meals prepared with olive oil instead of butter. · Order dishes made with marinara sauce or sauces made from olive oil. Avoid sauces made from cream or mayonnaise. · Choose whole-grain breads, whole wheat pasta and pizza crust, brown rice, beans, and lentils. · Cut back on butter or margarine on bread. Instead, you can dip your bread in a small amount of olive oil. · Ask for a side salad or grilled vegetables instead of french fries or chips. Where can you learn more? Go to http://inderjit-amna.info/. Enter 319-003-2633 in the search box to learn more about \"Learning About the Mediterranean Diet. \" Current as of: March 28, 2018 Content Version: 11.9 © 2754-0310 [employer], [employer]. Care instructions adapted under license by [employer] (which disclaims liability or warranty for this information). If you have questions about a medical condition or this instruction, always ask your healthcare professional. [place] [address] any warranty or liability for your use of this information.

## 2019-04-30 NOTE — PROGRESS NOTES
Assessment/Plan: 1. Routine general medical examination at a health care facility 2. Vitamin D deficiency 
- cholecalciferol, VITAMIN D3, (VITAMIN D3) 5,000 unit tab tablet; Take 1 Tab by mouth daily. 3. Pure hypercholesterolemia 4. Class 2 severe obesity due to excess calories with serious comorbidity and body mass index (BMI) of 38.0 to 38.9 in adult [facility]) 
-work on wt loss. 5. Psychosocial stressors and anxiety 
-start wellbutrin to help with wt loss. - buPROPion XL (WELLBUTRIN XL) 150 mg tablet; Take 1 Tab by mouth every morning. Dispense: 30 Tab; Refill: 0 
 
6. 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 
-add rogaine. Discussed worsening hair loss may be related to stress. - minoxidil (ROGAINE) 2 % external solution; Apply to scalp bid  Dispense: 240 mL; Refill: 1 The plan was discussed with the patient. The patient verbalized understanding and is in agreement with the plan. All medication potential side effects were discussed with the patient. Health Maintenance:  
Health Maintenance Topic Date Due  
 PAP AKA CERVICAL CYTOLOGY  01/01/2019  Influenza Age 5 to Adult  08/01/2019  
 DTaP/Tdap/Td series (2 - Td) 01/01/2020  BREAST CANCER SCRN MAMMOGRAM  03/04/2021  Pneumococcal 0-64 years  Aged Out  
 
 
Cortez Barrios is a 37 y.o. female and presents with Physical 
  
Subjective: 
Here for physical.   She has stress at work and is seeing a therapist, just started last week. Feels overwhelmed, anxious. She may be losing her job soon (which she likes). She's more irritable. She stress eats and has gained weight. Has previously been on lexapro. ROS: 
Constitutional: No recent weight change. No weakness/fatigue. No f/c. Skin: No rashes, change in nails/hair, itching HENT: No HA, dizziness. No hearing loss/tinnitus. No nasal congestion/discharge. Eyes: No change in vision, double/blurred vision or eye pain/redness. Cardiovascular: No CP/palpitations. No CRISTOBAL/orthopnea/PND. Respiratory: No cough/sputum, dyspnea, wheezing. Gastointestinal: No dysphagia, reflux. No n/v. No constipation/diarrhea. No melena/rectal bleeding. Genitourinary: No dysuria, urinary hesitancy, nocturia, hematuria. No incontinence. Musculoskeletal: No joint pain/stiffness. No muscle pain/tenderness. Endo: No heat/cold intolerance, no polyuria/polydypsia. Heme: No h/o anemia. No easy bleeding/bruising. Allergy/Immunology: No seasonal rhinitis. Denies frequent colds, sinus/ear infections. Neurological: No seizures/numbness/weakness. No paresthesias. Psychiatric:  No depression, +anxiety. The problem list was updated as a part of today's visit. Patient Active Problem List  
Diagnosis Code  Anxiety F41.9  Vitamin D deficiency E55.9  Obesity (BMI 30-39. 9) E66.9  
 Irregular menstrual cycle N92.6  Menorrhagia N92.0  Depression F32.9  Psychophysiological insomnia F51.04  
 Elevated blood sugar R73.9  Female pattern baldness L65.8  Pure hypercholesterolemia E78.00 The PSH, FH were reviewed. SH: Social History Tobacco Use  Smoking status: Former Smoker Types: Cigarettes Start date: 3/18/1989 Last attempt to quit: 3/18/1999 Years since quittin.1  Smokeless tobacco: Never Used Substance Use Topics  Alcohol use: Yes Alcohol/week: 0.5 oz Types: 1 Standard drinks or equivalent per week Comment: < 1/week  Drug use: No  
 
 
Medications/Allergies: 
Current Outpatient Medications on File Prior to Visit Medication Sig Dispense Refill  OTHER     
 spironolactone (ALDACTONE) 25 mg tablet TAKE 1 TABLET BY MOUTH DAILY 90 Tab 3  
 finasteride (PROPECIA) 1 mg tablet TAKE 1 TABLET BY MOUTH ONCE DAILY 90 Tab 3  
 Omega-3 Fatty Acids (FISH OIL) 500 mg cap Take  by mouth.  cholecalciferol, vitamin D3, (VITAMIN D3 PO) Take  by mouth.  traZODone (DESYREL) 150 mg tablet Take 1 Tab by mouth nightly as needed for Sleep. 90 Tab 1  ALPRAZolam (XANAX) 0.25 mg tablet Take 1 Tab by mouth daily as needed for Anxiety. 30 Tab 0 No current facility-administered medications on file prior to visit. Not on File Objective: 
Visit Vitals /78 (BP 1 Location: Left arm, BP Patient Position: Sitting) Pulse (!) 104 Temp 99.4 °F (37.4 °C) (Oral) Resp 20 Ht 5' 3.25\" (1.607 m) Wt 214 lb (97.1 kg) LMP 04/17/2019 SpO2 97% BMI 37.61 kg/m² Constitutional: Well developed, nourished, no distress, alert, obese habitus HENT: Exterior ears and tympanic membranes normal bilaterally. Supple neck. No thyromegaly or lymphadenopathy. Oropharynx clear and moist mucous membranes. Normal inferior turbinates. Septum midline. Eyes: Conjunctiva normal. PERRL. Eyelids normal.  
CV: S1, S2.  RRR. No murmurs/rubs. No thrills palpated. No carotid bruits. Intact distal pulses. No edema. No aortic bruits. Pulm: No abnormalities on inspection. Clear to auscultation bilaterally. No wheezing/rhonchi. Normal effort. GI: Soft, nontender, nondistended. Normal active bowel sounds. MS: Gait normal.  Joints without deformity/tenderness. Strength intact bilateral upper and lower ext. Normal ROM all extremities. Neuro: A/O x 3. No focal motor or sensory deficits. Speech normal.  
Skin: No lesions/rashes on inspection. Psych: Appropriate affect, judgement and insight. Short-term memory intact. Labwork and Ancillary Studies: CBC w/Diff Lab Results Component Value Date/Time WBC 6.2 03/14/2019 10:17 AM  
 HGB 12.5 03/14/2019 10:17 AM  
 PLATELET 926 76/61/5326 10:17 AM  
  
 
 Basic Metabolic Profile/LFTs Lab Results Component Value Date/Time  Sodium 140 03/14/2019 10:17 AM  
 Potassium 4.1 03/14/2019 10:17 AM  
 Chloride 110 (H) 03/14/2019 10:17 AM  
 CO2 22 03/14/2019 10:17 AM  
 Anion gap 8 03/14/2019 10:17 AM  
 Glucose 90 03/14/2019 10:17 AM  
 BUN 18 03/14/2019 10:17 AM  
 Creatinine 1.05 03/14/2019 10:17 AM  
 BUN/Creatinine ratio 17 03/14/2019 10:17 AM  
 GFR est AA >60 03/14/2019 10:17 AM  
 GFR est non-AA 57 (L) 03/14/2019 10:17 AM  
 Calcium 9.3 03/14/2019 10:17 AM  
  
Lab Results Component Value Date/Time ALT (SGPT) <6 (L) 03/14/2019 10:17 AM  
 AST (SGOT) 17 03/14/2019 10:17 AM  
 Alk. phosphatase 74 03/14/2019 10:17 AM  
 Bilirubin, total 0.3 03/14/2019 10:17 AM  
 
 
Cholesterol Lab Results Component Value Date/Time  Cholesterol, total 225 (H) 03/14/2019 10:17 AM  
 HDL Cholesterol 59 03/14/2019 10:17 AM  
 LDL, calculated 143.4 (H) 03/14/2019 10:17 AM  
 Triglyceride 113 03/14/2019 10:17 AM  
 CHOL/HDL Ratio 3.8 03/14/2019 10:17 AM

## 2019-05-14 ENCOUNTER — OFFICE VISIT (OUTPATIENT)
Dept: FAMILY MEDICINE CLINIC | Age: 44
End: 2019-05-14

## 2019-05-14 VITALS
RESPIRATION RATE: 19 BRPM | TEMPERATURE: 98.3 F | HEIGHT: 63 IN | BODY MASS INDEX: 38.27 KG/M2 | HEART RATE: 83 BPM | WEIGHT: 216 LBS | DIASTOLIC BLOOD PRESSURE: 64 MMHG | SYSTOLIC BLOOD PRESSURE: 98 MMHG | OXYGEN SATURATION: 98 %

## 2019-05-14 DIAGNOSIS — J06.9 VIRAL UPPER RESPIRATORY TRACT INFECTION: Primary | ICD-10-CM

## 2019-05-14 NOTE — PROGRESS NOTES
Subjective:   Nancy Dc is a 37 y.o. female who complains of congestion, sore throat and post nasal drip for 4 days, gradually worsening since that time. She denies a history of shortness of breath and wheezing. Evaluation to date: none. Treatment to date: OTC products. Patient does not smoke cigarettes. Relevant PMH: No pertinent additional PMH. Current Outpatient Medications   Medication Sig Dispense Refill    OTHER       cholecalciferol, VITAMIN D3, (VITAMIN D3) 5,000 unit tab tablet Take 1 Tab by mouth daily.  minoxidil (ROGAINE) 2 % external solution Apply to scalp bid 240 mL 1    buPROPion XL (WELLBUTRIN XL) 150 mg tablet Take 1 Tab by mouth every morning. 30 Tab 0    spironolactone (ALDACTONE) 25 mg tablet TAKE 1 TABLET BY MOUTH DAILY 90 Tab 3    finasteride (PROPECIA) 1 mg tablet TAKE 1 TABLET BY MOUTH ONCE DAILY 90 Tab 3    Omega-3 Fatty Acids (FISH OIL) 500 mg cap Take  by mouth.  traZODone (DESYREL) 150 mg tablet Take 1 Tab by mouth nightly as needed for Sleep. 90 Tab 1    ALPRAZolam (XANAX) 0.25 mg tablet Take 1 Tab by mouth daily as needed for Anxiety. 30 Tab 0     No Known Allergies     Review of Systems  Pertinent items are noted in HPI. Objective:     Visit Vitals  BP 98/64 (BP 1 Location: Left arm, BP Patient Position: Sitting)   Pulse 83   Temp 98.3 °F (36.8 °C) (Oral)   Resp 19   Ht 5' 3.25\" (1.607 m)   Wt 216 lb (98 kg)   LMP 04/17/2019   SpO2 98%   BMI 37.96 kg/m²     General:  alert, cooperative, mild distress   Eyes: negative   Ears: normal TM's and external ear canals AU   Sinuses: Normal paranasal sinuses without tenderness   Mouth:  Lips, mucosa, and tongue normal. Teeth and gums normal and abnormal findings: mild oropharyngeal erythema   Neck: supple, symmetrical, trachea midline and no adenopathy. Heart: S1 and S2 normal, no murmurs noted. Lungs: clear to auscultation bilaterally   Abdomen: soft, non-tender.  Bowel sounds normal. No masses,  no organomegaly        Assessment/Plan:   viral upper respiratory illness  Suggested symptomatic OTC remedies. RTC prn. Discussed diagnosis and treatment of viral URIs. Discussed the importance of avoiding unnecessary antibiotic therapy. .zyrtec, flonase, mucinex and note for work.    See back as needed

## 2019-05-14 NOTE — LETTER
NOTIFICATION RETURN TO WORK  
 
5/14/2019 12:15 PM 
 
Ms. Kimberly Bar 
[address] [facility] [place] [address] [facility] [address] To Whom It May Concern: 
 
Kimberly Bar is currently under the care of [address] [place] [address]. She will return to work Thursday, May 16, 2019. If there are questions or concerns please have the patient contact our office.  
 
 
 
Sincerely, 
 
 
Johana Khan NP

## 2019-05-14 NOTE — PROGRESS NOTES
Dakota Ornelas is a 37 y.o. female (: 1975) presenting to address:    Chief Complaint   Patient presents with    Sinus Pain     x 4 days     Cold Symptoms     ear pain,congestion       Vitals:    19 1201   BP: 98/64   Pulse: 83   Resp: 19   Temp: 98.3 °F (36.8 °C)   TempSrc: Oral   SpO2: 98%   Weight: 216 lb (98 kg)   Height: 5' 3.25\" (1.607 m)   PainSc:   4   PainLoc: Generalized   LMP: 2019       Hearing/Vision:   No exam data present    Learning Assessment:     Learning Assessment 3/18/2015   PRIMARY LEARNER Patient   HIGHEST LEVEL OF EDUCATION - PRIMARY LEARNER  -   BARRIERS PRIMARY LEARNER -   CO-LEARNER CAREGIVER -   PRIMARY LANGUAGE ENGLISH   LEARNER PREFERENCE PRIMARY DEMONSTRATION   ANSWERED BY Patient   RELATIONSHIP SELF     Depression Screening:     3 most recent PHQ Screens 2019   Little interest or pleasure in doing things Not at all   Feeling down, depressed, irritable, or hopeless Not at all   Total Score PHQ 2 0     Fall Risk Assessment:     Fall Risk Assessment, last 12 mths 2/15/2018   Able to walk? Yes   Fall in past 12 months? No     Abuse Screening:     Abuse Screening Questionnaire 2017   Do you ever feel afraid of your partner? N   Are you in a relationship with someone who physically or mentally threatens you? N   Is it safe for you to go home? Y     Coordination of Care Questionaire:   1. Have you been to the ER, urgent care clinic since your last visit? Hospitalized since your last visit? No     2. Have you seen or consulted any other health care providers outside of the [address] [place] [address] since your last visit? Include any pap smears or colon screening  No     Advanced Directive:   1. Do you have an Advanced Directive? No   2. Would you like information on Advanced Directives?   No     Health Maintenance Due   Topic Date Due    PAP AKA CERVICAL CYTOLOGY  2019     Upcoming pap smear

## 2019-05-14 NOTE — PATIENT INSTRUCTIONS
Zyrtec  flonase  mucinex     Upper Respiratory Infection (Cold): Care Instructions  Your Care Instructions    An upper respiratory infection, or URI, is an infection of the nose, sinuses, or throat. URIs are spread by coughs, sneezes, and direct contact. The common cold is the most frequent kind of URI. The flu and sinus infections are other kinds of URIs. Almost all URIs are caused by viruses. Antibiotics won't cure them. But you can treat most infections with home care. This may include drinking lots of fluids and taking over-the-counter pain medicine. You will probably feel better in 4 to 10 days. The doctor has checked you carefully, but problems can develop later. If you notice any problems or new symptoms, get medical treatment right away. Follow-up care is a key part of your treatment and safety. Be sure to make and go to all appointments, and call your doctor if you are having problems. It's also a good idea to know your test results and keep a list of the medicines you take. How can you care for yourself at home? · To prevent dehydration, drink plenty of fluids, enough so that your urine is light yellow or clear like water. Choose water and other caffeine-free clear liquids until you feel better. If you have kidney, heart, or liver disease and have to limit fluids, talk with your doctor before you increase the amount of fluids you drink. · Take an over-the-counter pain medicine, such as acetaminophen (Tylenol), ibuprofen (Advil, Motrin), or naproxen (Aleve). Read and follow all instructions on the label. · Before you use cough and cold medicines, check the label. These medicines may not be safe for young children or for people with certain health problems. · Be careful when taking over-the-counter cold or flu medicines and Tylenol at the same time. Many of these medicines have acetaminophen, which is Tylenol. Read the labels to make sure that you are not taking more than the recommended dose.  Too much acetaminophen (Tylenol) can be harmful. · Get plenty of rest.  · Do not smoke or allow others to smoke around you. If you need help quitting, talk to your doctor about stop-smoking programs and medicines. These can increase your chances of quitting for good. When should you call for help? Call 911 anytime you think you may need emergency care. For example, call if:    · You have severe trouble breathing.    Call your doctor now or seek immediate medical care if:    · You seem to be getting much sicker.     · You have new or worse trouble breathing.     · You have a new or higher fever.     · You have a new rash.    Watch closely for changes in your health, and be sure to contact your doctor if:    · You have a new symptom, such as a sore throat, an earache, or sinus pain.     · You cough more deeply or more often, especially if you notice more mucus or a change in the color of your mucus.     · You do not get better as expected. Where can you learn more? Go to http://inderjit-amna.info/. Enter C424 in the search box to learn more about \"Upper Respiratory Infection (Cold): Care Instructions. \"  Current as of: September 5, 2018  Content Version: 11.9  © 6219-7221 [employer]. Care instructions adapted under license by [employer] (which disclaims liability or warranty for this information). If you have questions about a medical condition or this instruction, always ask your healthcare professional. [place] [address] any warranty or liability for your use of this information.

## 2019-05-29 DIAGNOSIS — F41.9 ANXIETY: ICD-10-CM

## 2019-05-29 DIAGNOSIS — Z65.8 PSYCHOSOCIAL STRESSORS: ICD-10-CM

## 2019-05-29 NOTE — TELEPHONE ENCOUNTER
Patient new pharmacy [facility] is requesting a refill of Bupropion  mg  Last filled 4/30/19- [facility]  Last OV 5/14/19  Next appt.  none

## 2019-05-30 RX ORDER — BUPROPION HYDROCHLORIDE 150 MG/1
150 TABLET ORAL
Qty: 90 TAB | Refills: 1 | Status: SHIPPED | OUTPATIENT
Start: 2019-05-30 | End: 2019-08-24 | Stop reason: SDUPTHER

## 2019-06-04 DIAGNOSIS — L65.8 FEMALE PATTERN BALDNESS: ICD-10-CM

## 2019-06-04 NOTE — TELEPHONE ENCOUNTER
Requested Prescriptions     Pending Prescriptions Disp Refills    minoxidil (ROGAINE) 2 % external solution 240 mL 1     Sig: Apply to scalp bid     Patient calling to request refill on:      Remaining pills:  Last appt:  Next appt:  No future appointments. Pharmacy:  [facility][1]    Patient aware of 72 hour time frame.

## 2019-06-05 RX ORDER — MINOXIDIL 2 %
SOLUTION, NON-ORAL TOPICAL
Qty: 240 ML | Refills: 1 | Status: SHIPPED | OUTPATIENT
Start: 2019-06-05 | End: 2020-08-14 | Stop reason: ALTCHOICE

## 2019-06-05 NOTE — TELEPHONE ENCOUNTER
[facility] pharmacy  requesting Minoxidil 2% solution  Last filled 4/30/19 ([facility])  Last visit 5/14/19  Next appt.  none

## 2019-06-06 ENCOUNTER — TELEPHONE (OUTPATIENT)
Dept: FAMILY MEDICINE CLINIC | Age: 44
End: 2019-06-06

## 2019-06-11 NOTE — TELEPHONE ENCOUNTER
Prior authorization not approved notified Dr Kin Solis and message sent via my chart to notify patient . Patient stated she will try and us good rx and pay out of pocket for medication.

## 2019-07-12 ENCOUNTER — OFFICE VISIT (OUTPATIENT)
Dept: FAMILY MEDICINE CLINIC | Age: 44
End: 2019-07-12

## 2019-07-12 VITALS
WEIGHT: 207.8 LBS | HEART RATE: 84 BPM | DIASTOLIC BLOOD PRESSURE: 65 MMHG | TEMPERATURE: 98.2 F | HEIGHT: 63 IN | OXYGEN SATURATION: 100 % | BODY MASS INDEX: 36.82 KG/M2 | RESPIRATION RATE: 19 BRPM | SYSTOLIC BLOOD PRESSURE: 120 MMHG

## 2019-07-12 DIAGNOSIS — D36.9 DERMOID CYST: Primary | ICD-10-CM

## 2019-07-12 NOTE — PROGRESS NOTES
Assessment/Plan:    1. Dermoid cyst  -may be inflamed from sleeping on pullout couch. Monitor. If not improving, will refer to derm for excision      The plan was discussed with the patient. The patient verbalized understanding and is in agreement with the plan. All medication potential side effects were discussed with the patient. Health Maintenance:   Health Maintenance   Topic Date Due    PAP AKA CERVICAL CYTOLOGY  2019    Influenza Age 5 to Adult  2019    DTaP/Tdap/Td series (2 - Td) 2020    BREAST CANCER SCRN MAMMOGRAM  2021    Pneumococcal 0-64 years  Aged Out       Juanjo Wolf is a 37 y.o. female and presents with Skin Problem     Subjective:  Pt c/o 4 day h/o lump on her back after sleeping on a pull out couch. Had some associated back pain. ROS:  Constitutional: No recent weight change. No weakness/fatigue. No f/c. Skin: No rashes, change in nails/hair, itching   HENT: No HA, dizziness. No hearing loss/tinnitus. No nasal congestion/discharge. Eyes: No change in vision, double/blurred vision or eye pain/redness. Cardiovascular: No CP/palpitations. No CRISTOBAL/orthopnea/PND. Respiratory: No cough/sputum, dyspnea, wheezing. The problem list was updated as a part of today's visit. Patient Active Problem List   Diagnosis Code    Anxiety F41.9    Vitamin D deficiency E55.9    Obesity (BMI 30-39. 9) E66.9    Irregular menstrual cycle N92.6    Menorrhagia N92.0    Depression F32.9    Psychophysiological insomnia F51.04    Female pattern baldness L65.8    Pure hypercholesterolemia E78.00    Class 2 severe obesity due to excess calories with serious comorbidity and body mass index (BMI) of 38.0 to 38.9 in adult (HCC) E66.01, Z68.38       The PSH, FH were reviewed.     SH:  Social History     Tobacco Use    Smoking status: Former Smoker     Types: Cigarettes     Start date: 3/18/1989     Last attempt to quit: 3/18/1999     Years since quittin.3    Smokeless tobacco: Never Used   Substance Use Topics    Alcohol use: Yes     Alcohol/week: 0.5 oz     Types: 1 Standard drinks or equivalent per week     Comment: < 1/week    Drug use: No       Medications/Allergies:  Current Outpatient Medications on File Prior to Visit   Medication Sig Dispense Refill    minoxidil (ROGAINE) 2 % external solution Apply to scalp bid 240 mL 1    buPROPion XL (WELLBUTRIN XL) 150 mg tablet Take 1 Tab by mouth every morning. 90 Tab 1    OTHER       cholecalciferol, VITAMIN D3, (VITAMIN D3) 5,000 unit tab tablet Take 1 Tab by mouth daily.  spironolactone (ALDACTONE) 25 mg tablet TAKE 1 TABLET BY MOUTH DAILY 90 Tab 3    finasteride (PROPECIA) 1 mg tablet TAKE 1 TABLET BY MOUTH ONCE DAILY 90 Tab 3    Omega-3 Fatty Acids (FISH OIL) 500 mg cap Take  by mouth.  traZODone (DESYREL) 150 mg tablet Take 1 Tab by mouth nightly as needed for Sleep. 90 Tab 1    ALPRAZolam (XANAX) 0.25 mg tablet Take 1 Tab by mouth daily as needed for Anxiety. 30 Tab 0     No current facility-administered medications on file prior to visit. No Known Allergies    Objective:  Visit Vitals  /65 (BP 1 Location: Left arm, BP Patient Position: Sitting)   Pulse 84   Temp 98.2 °F (36.8 °C) (Oral)   Resp 19   Ht 5' 3.25\" (1.607 m)   Wt 207 lb 12.8 oz (94.3 kg)   SpO2 100%   BMI 36.52 kg/m²      Constitutional: Well developed, nourished, no distress, alert   CV: S1, S2.  RRR. No murmurs/rubs. No edema. Pulm: No abnormalities on inspection. Clear to auscultation bilaterally. No wheezing/rhonchi. Normal effort. Skin: Pea sized nodule L sacrum that has overlying erythema, no fluctuance.

## 2019-07-12 NOTE — PROGRESS NOTES
Bala Hawkins is a 37 y.o. female (: 1975) presenting to address:    Chief Complaint   Patient presents with    Skin Problem       Vitals:    19 1303   BP: 120/65   Pulse: 84   Resp: 19   Temp: 98.2 °F (36.8 °C)   TempSrc: Oral   SpO2: 100%   Weight: 207 lb 12.8 oz (94.3 kg)   Height: 5' 3.25\" (1.607 m)   PainSc:   1   PainLoc: Back       Hearing/Vision:   No exam data present    Learning Assessment:     Learning Assessment 3/18/2015   PRIMARY LEARNER Patient   HIGHEST LEVEL OF EDUCATION - PRIMARY LEARNER  -   BARRIERS PRIMARY LEARNER -   CO-LEARNER CAREGIVER -   PRIMARY LANGUAGE ENGLISH   LEARNER PREFERENCE PRIMARY DEMONSTRATION   ANSWERED BY Patient   RELATIONSHIP SELF     Depression Screening:     3 most recent PHQ Screens 2019   Little interest or pleasure in doing things Not at all   Feeling down, depressed, irritable, or hopeless Not at all   Total Score PHQ 2 0     Fall Risk Assessment:     Fall Risk Assessment, last 12 mths 2/15/2018   Able to walk? Yes   Fall in past 12 months? No     Abuse Screening:     Abuse Screening Questionnaire 2017   Do you ever feel afraid of your partner? N   Are you in a relationship with someone who physically or mentally threatens you? N   Is it safe for you to go home? Y     Coordination of Care Questionaire:   1. Have you been to the ER, urgent care clinic since your last visit? Hospitalized since your last visit?  no    2. Have you seen or consulted any other health care providers outside of the [address] [place] [address] since your last visit? Include any pap smears or colon screening. No     Advanced Directive:   1. Do you have an Advanced Directive? No   2.  Would you like information on Advanced Directives  No       Health Maintenance Due   Topic Date Due    PAP AKA CERVICAL CYTOLOGY  2019

## 2019-08-24 DIAGNOSIS — F41.9 ANXIETY: ICD-10-CM

## 2019-08-24 DIAGNOSIS — Z65.8 PSYCHOSOCIAL STRESSORS: ICD-10-CM

## 2019-08-26 RX ORDER — BUPROPION HYDROCHLORIDE 150 MG/1
TABLET ORAL
Qty: 90 TAB | Refills: 1 | Status: SHIPPED | OUTPATIENT
Start: 2019-08-26 | End: 2019-09-11 | Stop reason: SDUPTHER

## 2019-09-04 DIAGNOSIS — F41.9 ANXIETY: ICD-10-CM

## 2019-09-04 RX ORDER — ALPRAZOLAM 0.25 MG/1
0.25 TABLET ORAL
Qty: 20 TAB | Refills: 1 | Status: SHIPPED | OUTPATIENT
Start: 2019-09-04 | End: 2019-09-11 | Stop reason: SDUPTHER

## 2019-09-11 DIAGNOSIS — F41.9 ANXIETY: ICD-10-CM

## 2019-09-11 DIAGNOSIS — Z65.8 PSYCHOSOCIAL STRESSORS: ICD-10-CM

## 2019-09-11 RX ORDER — ALPRAZOLAM 0.25 MG/1
0.25 TABLET ORAL
Qty: 20 TAB | Refills: 1 | Status: SHIPPED | OUTPATIENT
Start: 2019-09-11 | End: 2020-04-20 | Stop reason: SDUPTHER

## 2019-09-11 RX ORDER — BUPROPION HYDROCHLORIDE 150 MG/1
TABLET ORAL
Qty: 90 TAB | Refills: 1 | Status: SHIPPED | OUTPATIENT
Start: 2019-09-11 | End: 2020-02-26 | Stop reason: SDUPTHER

## 2020-01-07 RX ORDER — PROMETHAZINE HYDROCHLORIDE 25 MG/1
25 TABLET ORAL
Qty: 30 TAB | Refills: 0 | Status: SHIPPED | OUTPATIENT
Start: 2020-01-07 | End: 2020-01-07 | Stop reason: SDUPTHER

## 2020-01-07 RX ORDER — PROMETHAZINE HYDROCHLORIDE 25 MG/1
25 TABLET ORAL
Qty: 30 TAB | Refills: 0 | Status: SHIPPED | OUTPATIENT
Start: 2020-01-07

## 2020-01-29 DIAGNOSIS — F51.04 PSYCHOPHYSIOLOGICAL INSOMNIA: ICD-10-CM

## 2020-01-29 RX ORDER — TRAZODONE HYDROCHLORIDE 150 MG/1
TABLET ORAL
Qty: 90 TAB | Refills: 3 | Status: SHIPPED | OUTPATIENT
Start: 2020-01-29

## 2020-02-26 DIAGNOSIS — F41.9 ANXIETY: ICD-10-CM

## 2020-02-26 DIAGNOSIS — Z65.8 PSYCHOSOCIAL STRESSORS: ICD-10-CM

## 2020-02-27 RX ORDER — BUPROPION HYDROCHLORIDE 150 MG/1
TABLET ORAL
Qty: 90 TAB | Refills: 1 | Status: SHIPPED | OUTPATIENT
Start: 2020-02-27 | End: 2020-04-03

## 2020-02-28 DIAGNOSIS — L65.8 FEMALE PATTERN BALDNESS: ICD-10-CM

## 2020-02-28 RX ORDER — FINASTERIDE 1 MG/1
TABLET, FILM COATED ORAL
Qty: 90 TAB | Refills: 2 | Status: SHIPPED | OUTPATIENT
Start: 2020-02-28 | End: 2020-08-14 | Stop reason: ALTCHOICE

## 2020-03-02 ENCOUNTER — DOCUMENTATION ONLY (OUTPATIENT)
Dept: FAMILY MEDICINE CLINIC | Age: 45
End: 2020-03-02

## 2020-04-03 DIAGNOSIS — Z65.8 PSYCHOSOCIAL STRESSORS: ICD-10-CM

## 2020-04-03 DIAGNOSIS — F41.9 ANXIETY: ICD-10-CM

## 2020-04-03 RX ORDER — BUPROPION HYDROCHLORIDE 300 MG/1
TABLET ORAL
Qty: 90 TAB | Refills: 1 | Status: SHIPPED | OUTPATIENT
Start: 2020-04-03 | End: 2020-07-26 | Stop reason: SDUPTHER

## 2020-04-20 ENCOUNTER — E-VISIT (OUTPATIENT)
Dept: FAMILY MEDICINE CLINIC | Age: 45
End: 2020-04-20

## 2020-04-20 DIAGNOSIS — F41.9 ANXIETY: ICD-10-CM

## 2020-04-20 RX ORDER — ALPRAZOLAM 0.25 MG/1
0.25 TABLET ORAL
Qty: 20 TAB | Refills: 0 | Status: SHIPPED | OUTPATIENT
Start: 2020-04-20 | End: 2020-08-14 | Stop reason: SDUPTHER

## 2020-04-20 NOTE — TELEPHONE ENCOUNTER
Pt requesting refill xanax. Last addressed 7/2019. Uses sparingly and  supports this hx. Will prescribe #20 to be used prn.

## 2020-07-01 DIAGNOSIS — E78.00 PURE HYPERCHOLESTEROLEMIA: Primary | ICD-10-CM

## 2020-08-14 ENCOUNTER — VIRTUAL VISIT (OUTPATIENT)
Dept: FAMILY MEDICINE CLINIC | Age: 45
End: 2020-08-14

## 2020-08-14 DIAGNOSIS — L65.8 FEMALE PATTERN BALDNESS: ICD-10-CM

## 2020-08-14 DIAGNOSIS — F41.9 ANXIETY: Primary | ICD-10-CM

## 2020-08-14 DIAGNOSIS — Z65.8 PSYCHOSOCIAL STRESSORS: ICD-10-CM

## 2020-08-14 RX ORDER — BUPROPION HYDROCHLORIDE 300 MG/1
TABLET ORAL
Qty: 90 TAB | Refills: 3 | Status: SHIPPED | OUTPATIENT
Start: 2020-08-14 | End: 2020-08-18 | Stop reason: SDUPTHER

## 2020-08-14 RX ORDER — ALPRAZOLAM 0.25 MG/1
0.25 TABLET ORAL
Qty: 20 TAB | Refills: 0 | Status: SHIPPED | OUTPATIENT
Start: 2020-08-14

## 2020-08-14 RX ORDER — SPIRONOLACTONE 25 MG/1
TABLET ORAL
Qty: 90 TAB | Refills: 3 | Status: SHIPPED | OUTPATIENT
Start: 2020-08-14 | End: 2020-10-06

## 2020-08-14 NOTE — PROGRESS NOTES
Ashley Ramos is a 53 y.o. female who was seen by synchronous (real-time) audio-video technology on 8/14/2020 for Follow-up    Assessment & Plan:   Diagnoses and all orders for this visit:    1. Anxiety  -     buPROPion XL (WELLBUTRIN XL) 300 mg XL tablet; TAKE 1 TABLET BY MOUTH EVERY DAY IN THE MORNING  -     ALPRAZolam (XANAX) 0.25 mg tablet; Take 1 Tab by mouth daily as needed for Anxiety. 2. Psychosocial stressors  -     buPROPion XL (WELLBUTRIN XL) 300 mg XL tablet; TAKE 1 TABLET BY MOUTH EVERY DAY IN THE MORNING    3. Female pattern baldness  -     spironolactone (ALDACTONE) 25 mg tablet; TAKE 1 TABLET BY MOUTH EVERY DAY        Subjective:   Pt hasn't been seen in a year. Anxiety - uses xanax sparingly. She feels she's doing well. Hair loss- on spironolactone. Prior to Admission medications    Medication Sig Start Date End Date Taking? Authorizing Provider   buPROPion XL (WELLBUTRIN XL) 300 mg XL tablet TAKE 1 TABLET BY MOUTH EVERY DAY IN THE MORNING 7/27/20   Danita Holter, MD   spironolactone (ALDACTONE) 25 mg tablet TAKE 1 TABLET BY MOUTH EVERY DAY 7/1/20   Paola Benitez MD   ALPRAZolam Agnes Frater) 0.25 mg tablet Take 1 Tab by mouth daily as needed for Anxiety. 4/20/20   Danita Holter, MD   finasteride (PROPECIA) 1 mg tablet TAKE ONE TABLET BY MOUTH DAILY 2/28/20 8/14/20  Danita Holter, MD   traZODone (DESYREL) 150 mg tablet TAKE 1 TABLET BY MOUTH NIGHTLY AS NEEDED FOR SLEEP 1/29/20   Paola Benitez MD   promethazine (PHENERGAN) 25 mg tablet Take 1 Tab by mouth every eight (8) hours as needed for Nausea. 1/7/20   Danita Holter, MD   minoxidil (ROGAINE) 2 % external solution Apply to scalp bid 6/5/19 8/14/20  Danita Holter, MD   OTHER     Provider, Historical   cholecalciferol, VITAMIN D3, (VITAMIN D3) 5,000 unit tab tablet Take 1 Tab by mouth daily. 4/30/19   Danita Holter, MD   Omega-3 Fatty Acids (FISH OIL) 500 mg cap Take  by mouth.     Provider, Historical     Patient Active Problem List   Diagnosis Code    Anxiety F41.9    Vitamin D deficiency E55.9    Obesity (BMI 30-39. 9) E66.9    Irregular menstrual cycle N92.6    Menorrhagia N92.0    Depression F32.9    Psychophysiological insomnia F51.04    Female pattern baldness L65.8    Pure hypercholesterolemia E78.00    Class 2 severe obesity due to excess calories with serious comorbidity and body mass index (BMI) of 38.0 to 38.9 in adult ([facility] E66.01, Z68.38       Review of Systems   Constitutional: Negative. Respiratory: Negative. Cardiovascular: Negative. Psychiatric/Behavioral: Positive for depression. The patient is nervous/anxious and has insomnia. Objective:   No flowsheet data found.      [INSTRUCTIONS:  \"[x]\" Indicates a positive item  \"[]\" Indicates a negative item  -- DELETE ALL ITEMS NOT EXAMINED]    Constitutional: [x] Appears well-developed and well-nourished [x] No apparent distress      [] Abnormal -     Mental status: [x] Alert and awake  [x] Oriented to person/place/time [x] Able to follow commands    [] Abnormal -     Eyes:   EOM    [x]  Normal    [] Abnormal -   Sclera  [x]  Normal    [] Abnormal -          Discharge [x]  None visible   [] Abnormal -     HENT: [x] Normocephalic, atraumatic  [] Abnormal -   [x] Mouth/Throat: Mucous membranes are moist    External Ears [x] Normal  [] Abnormal -    Neck: [x] No visualized mass [] Abnormal -     Pulmonary/Chest: [x] Respiratory effort normal   [x] No visualized signs of difficulty breathing or respiratory distress        [] Abnormal -      Musculoskeletal:   [] Normal gait with no signs of ataxia         [] Normal range of motion of neck        [] Abnormal -     Neurological:        [] No Facial Asymmetry (Cranial nerve 7 motor function) (limited exam due to video visit)          [] No gaze palsy        [] Abnormal -          Skin:        [] No significant exanthematous lesions or discoloration noted on facial skin         [] Abnormal - Psychiatric:       [x] Normal Affect [] Abnormal -        [x] No Hallucinations    Other pertinent observable physical exam findings:-        We discussed the expected course, resolution and complications of the diagnosis(es) in detail. Medication risks, benefits, costs, interactions, and alternatives were discussed as indicated. I advised her to contact the office if her condition worsens, changes or fails to improve as anticipated. She expressed understanding with the diagnosis(es) and plan. Cresencio Kelly, who was evaluated through a patient-initiated, synchronous (real-time) audio-video encounter, and/or her healthcare decision maker, is aware that it is a billable service, with coverage as determined by her insurance carrier. She provided verbal consent to proceed: Yes, and patient identification was verified. It was conducted pursuant to the emergency declaration under the [address] [place] [address], [address] [place] [address] authority and the [employer] Act. A caregiver was present when appropriate. Ability to conduct physical exam was limited. I was at home. The patient was at home.       Andrez Zamora MD

## 2020-08-18 DIAGNOSIS — F41.9 ANXIETY: ICD-10-CM

## 2020-08-18 DIAGNOSIS — Z65.8 PSYCHOSOCIAL STRESSORS: ICD-10-CM

## 2020-08-18 RX ORDER — BUPROPION HYDROCHLORIDE 300 MG/1
TABLET ORAL
Qty: 90 TAB | Refills: 3 | Status: SHIPPED | OUTPATIENT
Start: 2020-08-18 | End: 2020-11-20

## 2020-08-22 ENCOUNTER — E-VISIT (OUTPATIENT)
Dept: FAMILY MEDICINE CLINIC | Age: 45
End: 2020-08-22

## 2020-08-22 DIAGNOSIS — L30.9 DERMATITIS: Primary | ICD-10-CM

## 2020-08-25 RX ORDER — CLOTRIMAZOLE AND BETAMETHASONE DIPROPIONATE 10; .64 MG/G; MG/G
CREAM TOPICAL
Qty: 15 G | Refills: 0 | Status: SHIPPED | OUTPATIENT
Start: 2020-08-25

## 2020-10-06 DIAGNOSIS — L65.8 FEMALE PATTERN BALDNESS: ICD-10-CM

## 2020-10-06 RX ORDER — SPIRONOLACTONE 25 MG/1
TABLET ORAL
Qty: 90 TAB | Refills: 3 | Status: SHIPPED | OUTPATIENT
Start: 2020-10-06 | End: 2021-02-11 | Stop reason: SDUPTHER

## 2020-10-28 ENCOUNTER — E-VISIT (OUTPATIENT)
Dept: FAMILY MEDICINE CLINIC | Age: 45
End: 2020-10-28

## 2020-10-28 DIAGNOSIS — M79.18 PIRIFORMIS MUSCLE PAIN: Primary | ICD-10-CM

## 2020-10-29 RX ORDER — NAPROXEN 500 MG/1
500 TABLET ORAL 2 TIMES DAILY WITH MEALS
Qty: 60 TAB | Refills: 0 | Status: SHIPPED | OUTPATIENT
Start: 2020-10-29

## 2020-10-29 NOTE — TELEPHONE ENCOUNTER
Pt with c/o suspected R piriformis syndrome. Worse with movement. Likely musculoskeletal.  Started a few days ago. No neuropathic signs. Failed advil. Will treat with stretches, naprosyn, heat and referral PT if not improving.

## 2020-11-19 DIAGNOSIS — F41.9 ANXIETY: ICD-10-CM

## 2020-11-19 DIAGNOSIS — Z65.8 PSYCHOSOCIAL STRESSORS: ICD-10-CM

## 2020-11-20 RX ORDER — BUPROPION HYDROCHLORIDE 300 MG/1
TABLET ORAL
Qty: 90 TAB | Refills: 3 | Status: SHIPPED | OUTPATIENT
Start: 2020-11-20 | End: 2021-02-11 | Stop reason: SDUPTHER

## 2020-12-18 DIAGNOSIS — L65.8 FEMALE PATTERN BALDNESS: ICD-10-CM

## 2020-12-18 RX ORDER — FINASTERIDE 1 MG/1
TABLET, FILM COATED ORAL
Qty: 90 TAB | Refills: 2 | Status: SHIPPED | OUTPATIENT
Start: 2020-12-18

## 2021-02-11 DIAGNOSIS — F41.9 ANXIETY: ICD-10-CM

## 2021-02-11 DIAGNOSIS — L65.8 FEMALE PATTERN BALDNESS: ICD-10-CM

## 2021-02-11 DIAGNOSIS — Z65.8 PSYCHOSOCIAL STRESSORS: ICD-10-CM

## 2021-02-11 RX ORDER — BUPROPION HYDROCHLORIDE 300 MG/1
TABLET ORAL
Qty: 90 TAB | Refills: 0 | Status: SHIPPED | OUTPATIENT
Start: 2021-02-11 | End: 2021-05-01

## 2021-02-11 RX ORDER — SPIRONOLACTONE 25 MG/1
TABLET ORAL
Qty: 90 TAB | Refills: 0 | Status: SHIPPED | OUTPATIENT
Start: 2021-02-11 | End: 2021-05-01

## 2021-02-11 NOTE — TELEPHONE ENCOUNTER
Last OV 8/14/20 . Last refilled 10/6/20 for 90 with 3 refills . Wellbutrin 90 with 3 refills . Patient has new mail order pharmacy [facility] No future appointments.

## 2021-06-11 ENCOUNTER — HOSPITAL ENCOUNTER (OUTPATIENT)
Dept: PHYSICAL THERAPY | Age: 46
End: 2021-06-11
Payer: COMMERCIAL

## 2021-06-18 ENCOUNTER — HOSPITAL ENCOUNTER (OUTPATIENT)
Dept: PHYSICAL THERAPY | Age: 46
Discharge: HOME | End: 2021-06-18
Payer: COMMERCIAL

## 2021-06-18 PROCEDURE — 97110 THERAPEUTIC EXERCISES: CPT

## 2021-06-18 PROCEDURE — 97161 PT EVAL LOW COMPLEX 20 MIN: CPT

## 2021-06-18 NOTE — PROGRESS NOTES
[address] [facility] PHYSICAL THERAPY AT [facility] [address] [place], [address] - Phone: [phone]  Fax: [phone] / [facility]  Patient Name: Patrick Rob : 1975   Medical   Diagnosis: Segmental and somatic dysfunction of rib cage [M99.08] Treatment Diagnosis: Left and right rib pain, tenderness   Onset Date: \"about a year ago\"     Referral Source: Tiara Muniz DO Start of Care ([facility]): 2021   Prior Hospitalization: See medical history Provider #: [id number]   Prior Level of Function: Comfortable sleeping positions and body movements for ADLs. Comorbidities: Hyperlipidemia; Depression; Weight Gain; Anxiety; Insomnia, Vitamin D Deficiency   Medications: Verified on Patient Summary List   The Plan of Care and following information is based on the information from the initial evaluation.   ===================================================================  Assessment / key information:  Pt is a 49 yo right handed female who presents with signs and symptoms consistent with left lateral and right anterior rib cage pain. CHUY/HPI: Onset about a year ago with no known injury or precipitating event/circumstances, on and off since then. Pt reports location of pain left lateral ribcage and right anterior rib care, nature of pain intermittent, current pain level 3/10, pain level at worst 5/10, and pain level at best 2/10. Aggravating factors: sleeping on sides. Relieving factors: None reported; avoidance of pain positions (on back)/motions. PMHx/Falls: No falls and no concerns about falling/balance. Home/Occupation: works full time in [employer] for [address], office environment. CLoF: Discomfort with sleeping on sides, with sitting and leaning to either side, and with sitting for longer periods of time (work).  Pt reports first noting pain with sleeping on sides, especially on L side, and need for putting opposite hand under anterolateral ribcage area. Pt also has ribcage discomfort with sitting on couch and leaning body to one side or the other. Pt denies pain or difficulty with self-care bathing/grooming or dressing tasks. Work is sedentary at desk in office and OK, but discomfort with sitting for longer periods of time (bothers low back). Pt denies any problems/difficulties associated with driving or with entering/exiting vehicle. Pt reports that heavy lifting/carrying tasks are OK, unless pain is already present/aggravated. Pt reports tenderness in areas of pain; denies crepitus. Getting into and out of bed are OK. Pt has had a CT scan and reports no significant findings. Pt enjoys gardening and denies any limitations or associated exacerbation of her symptoms. Pt has  Treadmill at home and denies latex allergies. Objective:   Posture: WFL, mildly flexed in sitting; sitting more erect is OK and slouching is also OK,   AROM (standing):  Mildly limited bilat shoulder for flexion more so than aBduction and no change in symptoms. Trunk fFexion = finger tips to ankles and OK, except HS stretching, good thoracic motion and mildly decreased lumbar motion; Trunk Extension = WFL with some LB discomfort; Trunk Side Bending = L finger tips to L lateral knee joint line, good motion and slight L lateral ribcage area discomfort; R finger tips to about inch proximal to R lateral knee joint line, good motion and some L lateral ribcage area discomfort; Trunk Rotation = WFL and OK bilat    Functional Mobility:  Sit <==> Stand WFL and OK; Sit <==> Supine WFL and OK; Supine <==> Long Sitting WFL and OK;  Rolling to L and to R in supine [facility] and OK    Palpation:  Tender to light palpation at left lateral rib area, about 7 - 10, over latissimus/serratus anterior/oblique muscles; tender to light palpation at right anterior rib angle, just inferior and lateral to xyphoid process, at costal cartilage of ribs 7/8 and rectus abdominis origin. Muscle Testing:  Not Tested/TBA--serratus anterior, latissimus, obliques, and rectus abdominus. Pt would benefit from skilled PT intervention in order to improve upon previously mentioned subjective/objective impairments. ===========================================================================================  Eval Complexity: History HIGH Complexity :3+ comorbidities / personal factors will impact the outcome/ POC ;  Examination  MEDIUM Complexity : 3 Standardized tests and measures addressing body structure, function, activity limitation and / or participation in recreation ; Presentation LOW Complexity: Stable;  Decision Making MEDIUM Complexity : FOTO score of 26-74; Overall Complexity LOW   Problem List: pain affecting function, edema affecting function, decrease ADL/ functional abilitiies and decrease activity tolerance   Treatment Plan may include any combination of the following: Therapeutic exercise, Therapeutic activities, Neuromuscular re-education, Physical agent/modality, Manual therapy, Patient education, Self Care training and Functional mobility training  Patient / Family readiness to learn indicated by: asking questions, trying to perform skills and interest  Persons(s) to be included in education: patient (P)  Barriers to Learning/Limitations: None  Measures taken, if barriers to learning    Patient Goal (s): \"no more pain\"   Patient self reported health status: good  Rehabilitation Potential: good   Short Term Goals: To be accomplished in  2-3  weeks:  1. Establish initial HEP and pt compliant with instructions. 2. Decrease max pain scale rating by >/= 2 points. 3. Increase trunk sidebending AROM in standing to R equal to motion to L.  4. Increase bilat shoulder AROM for flex/aBd in standing to [facility] without pain.  Long Term Goals: To be accomplished in  4-6  weeks:  1. Increase FOTO score to >/= 63 indicating improved function.   2. No/minimal tenderness to palpation. 3. Pt able to sleep on her sides comfortable/without restriction and not awakened by pain. 4. No pain with trunk and body motions and positions for ADLs and transfers/transitions. Frequency / Duration:   Patient to be seen  2  times per week for 4-6  weeks:  Patient / Caregiver education and instruction: self care, activity modification, and exercises  Therapist Signature: Emy Hassan PT Date: 4/74/8163   Certification Period: NA Time: 1:38 PM   ===========================================================================================    To ensure your patient receives the highest quality care and to avoid disruption in therapy please sign and return this plan of care within 21 days. Per Medicaid guidelines if the plan of care is not received within 21 days the patient's care must be put on hold until signed. I certify that the above Physical Therapy Services are being furnished while the patient is under my care. I agree with the treatment plan and certify that this therapy is necessary. Physician Signature:      Date:       Time:     Srinivasa Ruiz DO      Please sign and return to [facility] or you may fax the signed copy to [phone]. Thank you.

## 2021-06-18 NOTE — PROGRESS NOTES
PHYSICAL THERAPY - DAILY TREATMENT NOTE     Patient Name: Ana Harding        Date: 2021  : 1975   YES Patient  Verified  Visit #:      12  Insurance: Payor: Andrew Davenport / Plan: [facility][1] PPO / Product Type: PPO /      In time: 9:06 AM Out time: 10:00 AM   Total Treatment Time: 54 min     Medicare/BCBS Time Tracking (below)   Total Timed Codes (min):  NA 1:1 Treatment Time:  NA     TREATMENT AREA =  Segmental and somatic dysfunction of rib cage [M99.08]    SUBJECTIVE    Pain Level (on 0 to 10 scale):  3  / 10   Medication Changes/New allergies or changes in medical history, any new surgeries or procedures? NO    If yes, update Summary List   Subjective Functional Status/Changes:  []  No changes reported     See POC         OBJECTIVE    12 min Therapeutic Exercise:  [x]  See flow sheet   Rationale:      increase ROM, improve coordination and increase proprioception to improve the patients ability to perform ADLs and chores, work, and sleep with less pain. 2 min Self Care: Discussed use of heat and ice; OTC lidocaine patches, posture. Rationale:    increase ROM, improve coordination and increase proprioception to improve the patients ability to perform ADLs and chores, work, and sleep with less pain.        Billed With/As:   [x] TE   [] TA   [] Neuro   [] Self Care Patient Education: [x] Review HEP    [] Progressed/Changed HEP based on:   [] positioning   [] body mechanics   [] transfers   [] heat/ice application    [] other:        Other Objective/Functional Measures:    See POC     Post Treatment Pain Level (on 0 to 10) scale:   3  / 10     ASSESSMENT    Assessment/Changes in Function:     See POC     []  See Progress Note/Recertification   Patient will continue to benefit from skilled PT services to modify and progress therapeutic interventions, address functional mobility deficits, address ROM deficits, address strength deficits, analyze and address soft tissue restrictions, analyze and cue movement patterns, analyze and modify body mechanics/ergonomics and assess and modify postural abnormalities to attain remaining goals.       Progress toward goals / Updated goals:    See POC       PLAN    [x]  Upgrade activities as tolerated YES Continue plan of care   []  Discharge due to :    []  Other:      Therapist: Nazia Champagne PT    Date: 6/18/2021 Time: 1:39 PM     Future Appointments   Date Time Provider Martha Chakraborty   6/23/2021  5:15 PM Ad Arrington, PT [facility]   6/25/2021  8:00 AM Hanny Keller PTA [facility]   7/2/2021  8:00 AM Hanny Keller PTA [facility]   7/6/2021  5:15 PM Iva Parisi, PT [facility]   7/8/2021  4:30 PM Iva Parisi, PT [facility]   7/12/2021  5:15 PM Ad Arrington, PT [facility]   7/15/2021  5:15 PM Iva Parisi, PT [facility]

## 2021-06-23 ENCOUNTER — HOSPITAL ENCOUNTER (OUTPATIENT)
Dept: PHYSICAL THERAPY | Age: 46
End: 2021-06-23
Payer: COMMERCIAL

## 2021-06-25 ENCOUNTER — APPOINTMENT (OUTPATIENT)
Dept: PHYSICAL THERAPY | Age: 46
End: 2021-06-25
Payer: COMMERCIAL

## 2021-07-02 ENCOUNTER — APPOINTMENT (OUTPATIENT)
Dept: PHYSICAL THERAPY | Age: 46
End: 2021-07-02
Payer: COMMERCIAL

## 2021-07-06 ENCOUNTER — APPOINTMENT (OUTPATIENT)
Dept: PHYSICAL THERAPY | Age: 46
End: 2021-07-06
Payer: COMMERCIAL

## 2021-07-08 ENCOUNTER — HOSPITAL ENCOUNTER (OUTPATIENT)
Dept: PHYSICAL THERAPY | Age: 46
Discharge: HOME | End: 2021-07-08
Payer: COMMERCIAL

## 2021-07-08 ENCOUNTER — APPOINTMENT (OUTPATIENT)
Dept: PHYSICAL THERAPY | Age: 46
End: 2021-07-08
Payer: COMMERCIAL

## 2021-07-08 PROCEDURE — 97112 NEUROMUSCULAR REEDUCATION: CPT

## 2021-07-08 PROCEDURE — 97110 THERAPEUTIC EXERCISES: CPT

## 2021-07-08 PROCEDURE — 97530 THERAPEUTIC ACTIVITIES: CPT

## 2021-07-08 NOTE — PROGRESS NOTES
PHYSICAL THERAPY - DAILY TREATMENT NOTE    Patient Name: Iliana Monahan        Date: 2021  : 1975   yes Patient  Verified  Visit #:   2   of   12  Insurance: Payor: BLUE CROSS / Plan: [facility] PPO / Product Type: PPO /      In time: 615 pm Out time: 653 pm   Total Treatment Time: 38     Medicare/BCBS Time Tracking (below)   Total Timed Codes (min):  38 1:1 Treatment Time:  38     TREATMENT AREA =  Segmental and somatic dysfunction of rib cage [M99.08]    SUBJECTIVE  Pain Level (on 0 to 10 scale):  4 / 10   Medication Changes/New allergies or changes in medical history, any new surgeries or procedures?    no  If yes, update Summary List   Subjective Functional Status/Changes:  []  No changes reported     Pt reports no change since IE, has been intermittently performing HEP prescribed. OBJECTIVE  20 min Therapeutic Exercise:  [x]  See flow sheet   Rationale:      increase ROM and increase strength to improve the patient's ability to perform functional tasks and ADLs     8 min Therapeutic Activity: [x]  See flow sheet   Rationale:    increase ROM, increase strength, improve coordination and increase proprioception to improve the patient's ability to perform functional tasks and ADLs    10 min Neuromuscular Re-ed: [x]  See flow sheet   Rationale:    increase strength, improve coordination and increase proprioception to improve the patient's ability to perform functional tasks and ADLs    Billed With/As:  [x] TE  [] TA  [] Neuro  [x] Self Care Patient Education: [x] Review HEP    [] Progressed/Changed HEP based on:   [x] positioning   [x] body mechanics   [] transfers   [] heat/ice application    [] other:     Other Objective/Functional Measures:    Performed exercises per flow sheet     Post Treatment Pain Level (on 0 to 10) scale:   4  / 10     ASSESSMENT  Assessment/Changes in Function:     No adverse effects noted with treatment this date.  Exercises tolerated without exacerbation of symptoms. Pt educated regarding role of each exercise in achieving program goals. Pt verbalized understanding. Assess for benefits of lumbothoracic mobilization nv.     []  See Progress Note/Recertification   Patient will continue to benefit from skilled PT services to modify and progress therapeutic interventions, address functional mobility deficits, address ROM deficits, address strength deficits, analyze and address soft tissue restrictions, analyze and cue movement patterns, analyze and modify body mechanics/ergonomics and assess and modify postural abnormalities to attain remaining goals.    Progress toward goals / Updated goals:    First f/u since POC     PLAN  [x]  Upgrade activities as tolerated yes Continue plan of care   []  Discharge due to :    []  Other:      Therapist: Tevin Herrera, PT    Date: 7/8/2021 Time: 6:00 PM     Future Appointments   Date Time Provider Martha Chakraborty   7/14/2021  6:00 PM Fran Burnett, PT [id number]   7/23/2021  1:30 PM Blaze Gonzalez [facility]   7/26/2021  3:45 PM Kya Wilcox PTA [facility]   7/30/2021  8:00 AM Shirley Ron, PT [facility]

## 2021-07-12 ENCOUNTER — APPOINTMENT (OUTPATIENT)
Dept: PHYSICAL THERAPY | Age: 46
End: 2021-07-12
Payer: COMMERCIAL

## 2021-07-14 ENCOUNTER — HOSPITAL ENCOUNTER (OUTPATIENT)
Dept: PHYSICAL THERAPY | Age: 46
Discharge: HOME | End: 2021-07-14
Payer: COMMERCIAL

## 2021-07-14 PROCEDURE — 97140 MANUAL THERAPY 1/> REGIONS: CPT | Performed by: PHYSICAL THERAPIST

## 2021-07-14 NOTE — PROGRESS NOTES
[facility] PHYSICAL THERAPY - DAILY TREATMENT NOTE    Patient Name: Froilan Giordano        Date: 2021  : 1975   yes Patient  Verified  Visit #:   3   of   12  Insurance: Payor: BLUE CROSS / Plan: [facility] PPO / Product Type: PPO /      In time: 540 Out time: 620   Total Treatment Time: 40     Medicare/BCBS Time Tracking (below)   Total Timed Codes (min):  40 1:1 Treatment Time:  40     TREATMENT AREA =  Segmental and somatic dysfunction of rib cage [M99.08]    SUBJECTIVE  Pain Level (on 0 to 10 scale):  4  / 10   Medication Changes/New allergies or changes in medical history, any new surgeries or procedures?    no  If yes, update Summary List   Subjective Functional Status/Changes:  []  No changes reported     I feel like nobody is really addressing what I am saying - gave me a bunch of stretches and the only one that seemed to even impact that area of my ribs was the ball one. It did not help feel better it was just like yeah that's the area I am talking about          OBJECTIVE      40 min Manual Therapy: Biomechanical assessment, ribs 7-9 inward roll, pa mobs 6-10 grade I, stm parsapinals   Rationale:      decrease pain, increase ROM, increase tissue extensibility and decrease trigger points to improve patient's ability to complete adls  The manual therapy interventions were performed at a separate and distinct time from the therapeutic activities interventions.   \    Billed With/As:   [] TE   [] TA   [] Neuro   [] Self Care Patient Education: [x] Review HEP    [] Progressed/Changed HEP based on:   [] positioning   [] body mechanics   [] transfers   [] heat/ice application    [] other:      Other Objective/Functional Measures:    mt performed behind closed curtain, shirt off with sports bra on with pt consent   Visible rib outflare along 7-9 with increased paraspinal tone, near 75% loss of ts l rot, 50% extension, palpation to costovertebral junction reproduced pain c/o     Post Treatment Pain Level (on 0 to 10) scale:   4  / 10     ASSESSMENT  Assessment/Changes in Function:     S/s appear rib/thoracic in nature however visceral referral cannot be ruled out. []  See Progress Note/Recertification   Patient will continue to benefit from skilled PT services to modify and progress therapeutic interventions, address functional mobility deficits, address ROM deficits, address strength deficits, analyze and address soft tissue restrictions, analyze and cue movement patterns, analyze and modify body mechanics/ergonomics, assess and modify postural abnormalities and instruct in home and community integration to attain remaining goals.    Progress toward goals / Updated goals:    Pt given self mobs for hep if improved with MT tech - advised to hold if increased pain     PLAN  []  Upgrade activities as tolerated yes Continue plan of care   []  Discharge due to :    []  Other:      Therapist: Diane Pool PT    Date: 7/14/2021 Time: 6:33 PM     Future Appointments   Date Time Provider Martha Chakraborty   7/23/2021  1:30 PM Mabel Groves [facility]   7/26/2021  3:45 PM Efrain Zamora, PTA [facility]   7/30/2021  8:00 AM Bradly Ron, PT [facility]

## 2021-07-15 ENCOUNTER — APPOINTMENT (OUTPATIENT)
Dept: PHYSICAL THERAPY | Age: 46
End: 2021-07-15
Payer: COMMERCIAL

## 2021-07-23 ENCOUNTER — HOSPITAL ENCOUNTER (OUTPATIENT)
Dept: PHYSICAL THERAPY | Age: 46
Discharge: HOME | End: 2021-07-23
Payer: COMMERCIAL

## 2021-07-23 PROCEDURE — 97140 MANUAL THERAPY 1/> REGIONS: CPT | Performed by: PHYSICAL THERAPIST

## 2021-07-23 NOTE — PROGRESS NOTES
[facility] PHYSICAL THERAPY - DAILY TREATMENT NOTE    Patient Name: Briana Catalan        Date: 2021  : 1975   yes Patient  Verified  Visit #:   4   of   12  Insurance: Payor: BLUE CROSS / Plan: [facility] PPO / Product Type: PPO /      In time: 750 Out time: 825   Total Treatment Time: 35     Medicare/[facility] Time Tracking (below)   Total Timed Codes (min):  35 1:1 Treatment Time:  35     TREATMENT AREA =  Segmental and somatic dysfunction of rib cage [M99.08]    SUBJECTIVE  Pain Level (on 0 to 10 scale):   / 10   Medication Changes/New allergies or changes in medical history, any new surgeries or procedures?    no  If yes, update Summary List   Subjective Functional Status/Changes:  []  No changes reported     I have had my fiancee work on those exercises we talked about last time. Not really sure if its any better but it snot any worse          OBJECTIVE      35 min Manual Therapy: graston L thoracic spine,  ribs 7-9 inward roll, pa mobs 6-10 grade I, stm parsapinals, l rot op    Rationale:      decrease pain, increase ROM, increase tissue extensibility and decrease trigger points to improve patient's ability to complete adls  The manual therapy interventions were performed at a separate and distinct time from the therapeutic activities interventions.       Billed With/As:   [] TE   [] TA   [] Neuro   [] Self Care Patient Education: [x] Review HEP    [] Progressed/Changed HEP based on:   [] positioning   [] body mechanics   [] transfers   [] heat/ice application    [] other:      Other Objective/Functional Measures:    mt performed behind closed curtain, shirt off with sports bra on with pt consent   Visible rib outflare along 7-9 with increased paraspinal tone, but able to tolerate increased manual pressure and mobilization than previous vsession   Post Treatment Pain Level (on 0 to 10) scale:   2  / 10     ASSESSMENT  Assessment/Changes in Function:     S/s appear rib/thoracic in nature however visceral referral cannot be ruled out. []  See Progress Note/Recertification   Patient will continue to benefit from skilled PT services to modify and progress therapeutic interventions, address functional mobility deficits, address ROM deficits, address strength deficits, analyze and address soft tissue restrictions, analyze and cue movement patterns, analyze and modify body mechanics/ergonomics, assess and modify postural abnormalities and instruct in home and community integration to attain remaining goals.    Progress toward goals / Updated goals:    Compliant with self mbos     PLAN  []  Upgrade activities as tolerated yes Continue plan of care   []  Discharge due to :    []  Other:      Therapist: Shade Armas PT    Date: 7/23/2021 Time: 6:33 PM     Future Appointments   Date Time Provider Martha Chakraborty   7/30/2021  8:00 AM Neville Ron, PT [id number]

## 2021-07-26 ENCOUNTER — APPOINTMENT (OUTPATIENT)
Dept: PHYSICAL THERAPY | Age: 46
End: 2021-07-26
Payer: COMMERCIAL

## 2021-07-30 ENCOUNTER — HOSPITAL ENCOUNTER (OUTPATIENT)
Dept: PHYSICAL THERAPY | Age: 46
Discharge: HOME | End: 2021-07-30
Payer: COMMERCIAL

## 2021-07-30 PROCEDURE — 97140 MANUAL THERAPY 1/> REGIONS: CPT | Performed by: PHYSICAL THERAPIST

## 2021-07-30 NOTE — PROGRESS NOTES
[facility] PHYSICAL THERAPY - DAILY TREATMENT NOTE    Patient Name: Anjali Alves        Date: 2021  : 1975   yes Patient  Verified  Visit #:   5   of   12  Insurance: Payor: BLUE CROSS / Plan: [facility] PPO / Product Type: PPO /      In time: 061 Out time: 043   Total Treatment Time: 38     Medicare/BCBS Time Tracking (below)   Total Timed Codes (min):  38 1:1 Treatment Time:  38     TREATMENT AREA =  Segmental and somatic dysfunction of rib cage [M99.08]    SUBJECTIVE  Pain Level (on 0 to 10 scale):  1 / 10   Medication Changes/New allergies or changes in medical history, any new surgeries or procedures?    no  If yes, update Summary List   Subjective Functional Status/Changes:  []  No changes reported     I am able to lay on that side without increasing pain         OBJECTIVE      38 min Manual Therapy: graston L thoracic spine,  ribs 7-9 inward roll, pa mobs 6-10 grade I, stm parsapinals, l rot op, sacral flexion,  dtm iliolumbar ligament   Rationale:      decrease pain, increase ROM, increase tissue extensibility and decrease trigger points to improve patient's ability to complete adls  The manual therapy interventions were performed at a separate and distinct time from the therapeutic activities interventions. Billed With/As:   [] TE   [] TA   [] Neuro   [] Self Care Patient Education: [x] Review HEP    [] Progressed/Changed HEP based on:   [] positioning   [] body mechanics   [] transfers   [] heat/ice application    [] other:      Other Objective/Functional Measures:    mt performed behind closed curtain, shirt off with sports bra on with pt consent   Able to tolerate dtm this session with mm guarding noted at t7 paraspinals, ttp along L lumbosacral junction    Post Treatment Pain Level (on 0 to 10) scale:   1  / 10     ASSESSMENT  Assessment/Changes in Function:     S/s appear rib/thoracic in naturet.       []  See Progress Note/Recertification   Patient will continue to benefit from skilled PT services to modify and progress therapeutic interventions, address functional mobility deficits, address ROM deficits, address strength deficits, analyze and address soft tissue restrictions, analyze and cue movement patterns, analyze and modify body mechanics/ergonomics, assess and modify postural abnormalities and instruct in home and community integration to attain remaining goals. Progress toward goals / Updated goals:    Progress with pain reduction      PLAN  []  Upgrade activities as tolerated yes Continue plan of care   []  Discharge due to :    []  Other:      Therapist: Niharika Valle, PT    Date: 7/30/2021 Time: 6:33 PM     No future appointments.

## 2021-08-05 ENCOUNTER — HOSPITAL ENCOUNTER (OUTPATIENT)
Dept: PHYSICAL THERAPY | Age: 46
Discharge: HOME | End: 2021-08-05
Payer: COMMERCIAL

## 2021-08-05 PROCEDURE — 97140 MANUAL THERAPY 1/> REGIONS: CPT | Performed by: PHYSICAL THERAPIST

## 2021-08-05 NOTE — PROGRESS NOTES
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    no  If yes, update Summary List   Subjective Functional Status/Changes:  []  No changes reported     I feel better when I leave here but it still hurts when I touch it          OBJECTIVE      40 min Manual Therapy: Grade iv L rotation mobs t4-9, rib inward and depression 7-10, intercostal stretch and release  ribs 7-10    Rationale:      decrease pain, increase ROM and increase tissue extensibility to improve patient's ability to complete adls  The manual therapy interventions were performed at a separate and distinct time from the therapeutic activities interventions.       Billed With/As:   [] TE   [] TA   [] Neuro   [] Self Care Patient Education: [x] Review HEP    [] Progressed/Changed HEP based on:   [] positioning   [] body mechanics   [] transfers   [] heat/ice application    [] other:      Other Objective/Functional Measures:    Discussed progressing intensity of mt to address continued pain and pt was amiable to htis  T/s arom l rotation 75%, RR full but noted pain at end range L rot along L ribs  ttp along intercostals 7-9 which reproduced chief pain c/o     Post Treatment Pain Level (on 0 to 10) scale:  3  / 10     ASSESSMENT  Assessment/Changes in Function:     Increased mm soreness following session but pt advised likely due to new treatment techniques and pt reported verbal response of appropriate self care     []  See Progress Note/Recertification   Patient will continue to benefit from skilled PT services to modify and progress therapeutic interventions, address functional mobility deficits, address ROM deficits, address strength deficits, analyze and address soft tissue restrictions, analyze and cue movement patterns, analyze and modify body mechanics/ergonomics, assess and modify postural abnormalities and instruct in home and community integration to attain remaining goals. Progress toward goals / Updated goals:    Improved rom but slow carry over with pain reduction.  Will continue to monitor to determine continued pt or refer out     PLAN  []  Upgrade activities as tolerated yes Continue plan of care   []  Discharge due to :    []  Other:      Therapist: Diamond Varela PT    Date: 8/5/2021 Time: 8:53 AM     Future Appointments   Date Time Provider Martha Chakraborty   8/18/2021  2:15 PM Debra Cope, PT [id number]   8/25/2021  8:00 AM Debra Cope PT [facility]   8/27/2021  8:00 AM Debra Cope PT [facility]   8/30/2021  2:00 PM Debra Cope PT [facility]   9/1/2021  8:00 AM John Ron, PT [id number]

## 2021-08-18 ENCOUNTER — APPOINTMENT (OUTPATIENT)
Dept: PHYSICAL THERAPY | Age: 46
End: 2021-08-18
Payer: COMMERCIAL

## 2021-08-25 ENCOUNTER — APPOINTMENT (OUTPATIENT)
Dept: PHYSICAL THERAPY | Age: 46
End: 2021-08-25
Payer: COMMERCIAL

## 2021-08-27 ENCOUNTER — APPOINTMENT (OUTPATIENT)
Dept: PHYSICAL THERAPY | Age: 46
End: 2021-08-27
Payer: COMMERCIAL

## 2021-08-30 ENCOUNTER — APPOINTMENT (OUTPATIENT)
Dept: PHYSICAL THERAPY | Age: 46
End: 2021-08-30
Payer: COMMERCIAL

## 2021-09-01 ENCOUNTER — APPOINTMENT (OUTPATIENT)
Dept: PHYSICAL THERAPY | Age: 46
End: 2021-09-01

## 2021-09-13 NOTE — PROGRESS NOTES
.[address] PHYSICAL THERAPY  [address] [facility], [address] 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 Unable to perform formal reassessment and the below findings were as of that time. Goal/Measure of Progress Goal Met? 1. Improve FOTO score to 66/100 to indicate improved function   Status at last Eval: 52/100 Current Status: Pt did not fill out n/a   2. No tenderness with palpation    Status at last Eval: Significant tenderness  Current Status: Significant tenderness no   3. Pt able to sleep on her side without pain   Status at last Eval: painful Current Status: painful no     RECOMMENDATIONS  Other: self discharge    If you have any questions/comments please contact us directly at [phone]. Thank you for allowing us to assist in the care of your patient.     Therapist Signature: Ed Closs, PT Date: 21     Time: 4:08 PM